# Patient Record
Sex: FEMALE | URBAN - METROPOLITAN AREA
[De-identification: names, ages, dates, MRNs, and addresses within clinical notes are randomized per-mention and may not be internally consistent; named-entity substitution may affect disease eponyms.]

---

## 2019-04-22 ENCOUNTER — APPOINTMENT (OUTPATIENT)
Age: 19
Setting detail: DERMATOLOGY
End: 2019-04-22

## 2019-04-22 VITALS — HEIGHT: 70 IN | RESPIRATION RATE: 16 BRPM | WEIGHT: 190 LBS

## 2019-04-22 DIAGNOSIS — L65.9 NONSCARRING HAIR LOSS, UNSPECIFIED: ICD-10-CM

## 2019-04-22 DIAGNOSIS — L70.0 ACNE VULGARIS: ICD-10-CM

## 2019-04-22 DIAGNOSIS — L90.5 SCAR CONDITIONS AND FIBROSIS OF SKIN: ICD-10-CM

## 2019-04-22 PROCEDURE — OTHER PRESCRIPTION: OTHER

## 2019-04-22 PROCEDURE — 99214 OFFICE O/P EST MOD 30 MIN: CPT | Mod: 25

## 2019-04-22 PROCEDURE — 11900 INJECT SKIN LESIONS </W 7: CPT

## 2019-04-22 PROCEDURE — OTHER INTRALESIONAL KENALOG: OTHER

## 2019-04-22 PROCEDURE — OTHER COUNSELING: OTHER

## 2019-04-22 RX ORDER — ADAPALENE 3 MG/G
THIN LAYER GEL TOPICAL QHS
Qty: 1 | Refills: 2 | Status: ERX | COMMUNITY
Start: 2019-04-22

## 2019-04-22 RX ORDER — DROSPIRENONE AND ETHINYL ESTRADIOL 0.02-3(28)
30 KIT ORAL DAILY
Qty: 30 | Refills: 2 | Status: ERX | COMMUNITY
Start: 2019-04-22

## 2019-04-22 ASSESSMENT — LOCATION SIMPLE DESCRIPTION DERM
LOCATION SIMPLE: NOSE
LOCATION SIMPLE: RIGHT SCALP
LOCATION SIMPLE: LEFT SCALP
LOCATION SIMPLE: LEFT FOREHEAD
LOCATION SIMPLE: NOSE
LOCATION SIMPLE: LEFT CHEEK

## 2019-04-22 ASSESSMENT — LOCATION DETAILED DESCRIPTION DERM
LOCATION DETAILED: LEFT INFERIOR CENTRAL MALAR CHEEK
LOCATION DETAILED: NASAL SUPRATIP
LOCATION DETAILED: LEFT CENTRAL FRONTAL SCALP
LOCATION DETAILED: NASAL TIP
LOCATION DETAILED: LEFT SUPERIOR MEDIAL FOREHEAD
LOCATION DETAILED: RIGHT LATERAL FRONTAL SCALP
LOCATION DETAILED: LEFT CENTRAL MALAR CHEEK
LOCATION DETAILED: LEFT MEDIAL MALAR CHEEK

## 2019-04-22 ASSESSMENT — LOCATION ZONE DERM
LOCATION ZONE: SCALP
LOCATION ZONE: FACE
LOCATION ZONE: NOSE
LOCATION ZONE: NOSE

## 2019-04-22 NOTE — HPI: SCAR
How Severe Is Your Scar?: mild
Is This A New Presentation, Or A Follow-Up?: Scar Follow Up
Additional History: Treated one month prior with silver nitrate.  Site is improving

## 2019-04-22 NOTE — PROCEDURE: COUNSELING
Tetracycline Counseling: Patient counseled regarding possible photosensitivity and increased risk for sunburn.  Patient instructed to avoid sunlight, if possible.  When exposed to sunlight, patients should wear protective clothing, sunglasses, and sunscreen.  The patient was instructed to call the office immediately if the following severe adverse effects occur:  hearing changes, easy bruising/bleeding, severe headache, or vision changes.  The patient verbalized understanding of the proper use and possible adverse effects of tetracycline.  All of the patient's questions and concerns were addressed. Patient understands to avoid pregnancy while on therapy due to potential birth defects.
Bactrim Pregnancy And Lactation Text: This medication is Pregnancy Category D and is known to cause fetal risk.  It is also excreted in breast milk.
Erythromycin Counseling:  I discussed with the patient the risks of erythromycin including but not limited to GI upset, allergic reaction, drug rash, diarrhea, increase in liver enzymes, and yeast infections.
Topical Retinoid Pregnancy And Lactation Text: This medication is Pregnancy Category C. It is unknown if this medication is excreted in breast milk.
Minocycline Pregnancy And Lactation Text: This medication is Pregnancy Category D and not consider safe during pregnancy. It is also excreted in breast milk.
Topical Retinoid counseling:  Patient advised to apply a pea-sized amount only at bedtime and wait 30 minutes after washing their face before applying.  If too drying, patient may add a non-comedogenic moisturizer. The patient verbalized understanding of the proper use and possible adverse effects of retinoids.  All of the patient's questions and concerns were addressed.
Use Enhanced Medication Counseling?: No
Topical Sulfur Applications Counseling: Topical Sulfur Counseling: Patient counseled that this medication may cause skin irritation or allergic reactions.  In the event of skin irritation, the patient was advised to reduce the amount of the drug applied or use it less frequently.   The patient verbalized understanding of the proper use and possible adverse effects of topical sulfur application.  All of the patient's questions and concerns were addressed.
Doxycycline Pregnancy And Lactation Text: This medication is Pregnancy Category D and not consider safe during pregnancy. It is also excreted in breast milk but is considered safe for shorter treatment courses.
Detail Level: Zone
Benzoyl Peroxide Pregnancy And Lactation Text: This medication is Pregnancy Category C. It is unknown if benzoyl peroxide is excreted in breast milk.
Erythromycin Pregnancy And Lactation Text: This medication is Pregnancy Category B and is considered safe during pregnancy. It is also excreted in breast milk.
Topical Sulfur Applications Pregnancy And Lactation Text: This medication is Pregnancy Category C and has an unknown safety profile during pregnancy. It is unknown if this topical medication is excreted in breast milk.
Isotretinoin Pregnancy And Lactation Text: This medication is Pregnancy Category X and is considered extremely dangerous during pregnancy. It is unknown if it is excreted in breast milk.
Detail Level: Detailed
Bactrim Counseling:  I discussed with the patient the risks of sulfa antibiotics including but not limited to GI upset, allergic reaction, drug rash, diarrhea, dizziness, photosensitivity, and yeast infections.  Rarely, more serious reactions can occur including but not limited to aplastic anemia, agranulocytosis, methemoglobinemia, blood dyscrasias, liver or kidney failure, lung infiltrates or desquamative/blistering drug rashes.
Tazorac Pregnancy And Lactation Text: This medication is not safe during pregnancy. It is unknown if this medication is excreted in breast milk.
Azithromycin Counseling:  I discussed with the patient the risks of azithromycin including but not limited to GI upset, allergic reaction, drug rash, diarrhea, and yeast infections.
High Dose Vitamin A Counseling: Side effects reviewed, pt to contact office should one occur.
Birth Control Pills Counseling: Birth Control Pill Counseling: I discussed with the patient the potential side effects of OCPs including but not limited to increased risk of stroke, heart attack, thrombophlebitis, deep venous thrombosis, hepatic adenomas, breast changes, GI upset, headaches, and depression.  The patient verbalized understanding of the proper use and possible adverse effects of OCPs. All of the patient's questions and concerns were addressed.
Benzoyl Peroxide Counseling: Patient counseled that medicine may cause skin irritation and bleach clothing.  In the event of skin irritation, the patient was advised to reduce the amount of the drug applied or use it less frequently.   The patient verbalized understanding of the proper use and possible adverse effects of benzoyl peroxide.  All of the patient's questions and concerns were addressed.
Spironolactone Counseling: Patient advised regarding risks of diarrhea, abdominal pain, hyperkalemia, birth defects (for female patients), liver toxicity and renal toxicity. The patient may need blood work to monitor liver and kidney function and potassium levels while on therapy. The patient verbalized understanding of the proper use and possible adverse effects of spironolactone.  All of the patient's questions and concerns were addressed.
Minocycline Counseling: Patient advised regarding possible photosensitivity and discoloration of the teeth, skin, lips, tongue and gums.  Patient instructed to avoid sunlight, if possible.  When exposed to sunlight, patients should wear protective clothing, sunglasses, and sunscreen.  The patient was instructed to call the office immediately if the following severe adverse effects occur:  hearing changes, easy bruising/bleeding, severe headache, or vision changes.  The patient verbalized understanding of the proper use and possible adverse effects of minocycline.  All of the patient's questions and concerns were addressed.
Dapsone Counseling: I discussed with the patient the risks of dapsone including but not limited to hemolytic anemia, agranulocytosis, rashes, methemoglobinemia, kidney failure, peripheral neuropathy, headaches, GI upset, and liver toxicity.  Patients who start dapsone require monitoring including baseline LFTs and weekly CBCs for the first month, then every month thereafter.  The patient verbalized understanding of the proper use and possible adverse effects of dapsone.  All of the patient's questions and concerns were addressed.
Topical Clindamycin Pregnancy And Lactation Text: This medication is Pregnancy Category B and is considered safe during pregnancy. It is unknown if it is excreted in breast milk.
Isotretinoin Counseling: Patient should get monthly blood tests, not donate blood, not drive at night if vision affected, not share medication, and not undergo elective surgery for 6 months after tx completed. Side effects reviewed, pt to contact office should one occur.
High Dose Vitamin A Pregnancy And Lactation Text: High dose vitamin A therapy is contraindicated during pregnancy and breast feeding.
Birth Control Pills Pregnancy And Lactation Text: This medication should be avoided if pregnant and for the first 30 days post-partum.
Spironolactone Pregnancy And Lactation Text: This medication can cause feminization of the male fetus and should be avoided during pregnancy. The active metabolite is also found in breast milk.
Azithromycin Pregnancy And Lactation Text: This medication is considered safe during pregnancy and is also secreted in breast milk.
Topical Clindamycin Counseling: Patient counseled that this medication may cause skin irritation or allergic reactions.  In the event of skin irritation, the patient was advised to reduce the amount of the drug applied or use it less frequently.   The patient verbalized understanding of the proper use and possible adverse effects of clindamycin.  All of the patient's questions and concerns were addressed.
Dapsone Pregnancy And Lactation Text: This medication is Pregnancy Category C and is not considered safe during pregnancy or breast feeding.
Tazorac Counseling:  Patient advised that medication is irritating and drying.  Patient may need to apply sparingly and wash off after an hour before eventually leaving it on overnight.  The patient verbalized understanding of the proper use and possible adverse effects of tazorac.  All of the patient's questions and concerns were addressed.
Doxycycline Counseling:  Patient counseled regarding possible photosensitivity and increased risk for sunburn.  Patient instructed to avoid sunlight, if possible.  When exposed to sunlight, patients should wear protective clothing, sunglasses, and sunscreen.  The patient was instructed to call the office immediately if the following severe adverse effects occur:  hearing changes, easy bruising/bleeding, severe headache, or vision changes.  The patient verbalized understanding of the proper use and possible adverse effects of doxycycline.  All of the patient's questions and concerns were addressed.

## 2019-05-15 ENCOUNTER — APPOINTMENT (OUTPATIENT)
Age: 19
Setting detail: DERMATOLOGY
End: 2019-05-16

## 2019-05-15 VITALS — WEIGHT: 194 LBS | RESPIRATION RATE: 16 BRPM | HEIGHT: 68 IN

## 2019-05-15 DIAGNOSIS — L63.8 OTHER ALOPECIA AREATA: ICD-10-CM

## 2019-05-15 DIAGNOSIS — L72.0 EPIDERMAL CYST: ICD-10-CM

## 2019-05-15 DIAGNOSIS — L91.0 HYPERTROPHIC SCAR: ICD-10-CM

## 2019-05-15 PROCEDURE — OTHER INTRALESIONAL KENALOG: OTHER

## 2019-05-15 PROCEDURE — OTHER COUNSELING: OTHER

## 2019-05-15 PROCEDURE — 11901 INJECT SKIN LESIONS >7: CPT

## 2019-05-15 ASSESSMENT — LOCATION SIMPLE DESCRIPTION DERM
LOCATION SIMPLE: SCALP
LOCATION SIMPLE: LEFT EYEBROW
LOCATION SIMPLE: RIGHT NOSE

## 2019-05-15 ASSESSMENT — LOCATION ZONE DERM
LOCATION ZONE: SCALP
LOCATION ZONE: NOSE
LOCATION ZONE: FACE

## 2019-05-15 ASSESSMENT — LOCATION DETAILED DESCRIPTION DERM
LOCATION DETAILED: RIGHT NASAL ALA
LOCATION DETAILED: RIGHT INFERIOR PARIETAL SCALP
LOCATION DETAILED: LEFT LATERAL EYEBROW
LOCATION DETAILED: LEFT INFERIOR PARIETAL SCALP
LOCATION DETAILED: RIGHT CENTRAL FRONTAL SCALP

## 2019-07-02 ENCOUNTER — APPOINTMENT (OUTPATIENT)
Age: 19
Setting detail: DERMATOLOGY
End: 2019-07-03

## 2019-07-02 VITALS — WEIGHT: 195 LBS | RESPIRATION RATE: 16 BRPM | HEIGHT: 68 IN

## 2019-07-02 DIAGNOSIS — L70.0 ACNE VULGARIS: ICD-10-CM

## 2019-07-02 DIAGNOSIS — L63.8 OTHER ALOPECIA AREATA: ICD-10-CM

## 2019-07-02 DIAGNOSIS — B35.4 TINEA CORPORIS: ICD-10-CM

## 2019-07-02 PROCEDURE — 11900 INJECT SKIN LESIONS </W 7: CPT

## 2019-07-02 PROCEDURE — 99214 OFFICE O/P EST MOD 30 MIN: CPT | Mod: 25

## 2019-07-02 PROCEDURE — OTHER COUNSELING: OTHER

## 2019-07-02 PROCEDURE — OTHER PRESCRIPTION: OTHER

## 2019-07-02 PROCEDURE — OTHER INTRALESIONAL KENALOG: OTHER

## 2019-07-02 PROCEDURE — OTHER PRESCRIPTION MEDICATION MANAGEMENT: OTHER

## 2019-07-02 RX ORDER — CLOBETASOL PROPIONATE 0.46 MG/ML
THIN LAYER SOLUTION TOPICAL BID
Qty: 1 | Refills: 0 | Status: ERX

## 2019-07-02 RX ORDER — CLINDAMYCIN PHOSPHATE 1 %
THIN LAYER SWAB, MEDICATED TOPICAL TWICE DAILY
Qty: 1 | Refills: 3 | Status: ERX | COMMUNITY
Start: 2019-07-02

## 2019-07-02 ASSESSMENT — LOCATION ZONE DERM
LOCATION ZONE: SCALP
LOCATION ZONE: NECK
LOCATION ZONE: FACE

## 2019-07-02 ASSESSMENT — LOCATION DETAILED DESCRIPTION DERM
LOCATION DETAILED: LEFT INFERIOR PARIETAL SCALP
LOCATION DETAILED: LEFT INFERIOR ANTERIOR NECK
LOCATION DETAILED: RIGHT INFERIOR PARIETAL SCALP
LOCATION DETAILED: LEFT CENTRAL MALAR CHEEK
LOCATION DETAILED: RIGHT CENTRAL FRONTAL SCALP

## 2019-07-02 ASSESSMENT — LOCATION SIMPLE DESCRIPTION DERM
LOCATION SIMPLE: SCALP
LOCATION SIMPLE: LEFT ANTERIOR NECK
LOCATION SIMPLE: LEFT CHEEK

## 2019-07-02 NOTE — PROCEDURE: PRESCRIPTION MEDICATION MANAGEMENT
Detail Level: Generalized
Continue Regimen: Laura and Differin as prescribed
Render In Strict Bullet Format?: No
Initiate Treatment: Clindamycin in morning
Otc Regimen: Continue vitamin E at night

## 2019-07-02 NOTE — PROCEDURE: COUNSELING
Detail Level: Zone
Bactrim Counseling:  I discussed with the patient the risks of sulfa antibiotics including but not limited to GI upset, allergic reaction, drug rash, diarrhea, dizziness, photosensitivity, and yeast infections.  Rarely, more serious reactions can occur including but not limited to aplastic anemia, agranulocytosis, methemoglobinemia, blood dyscrasias, liver or kidney failure, lung infiltrates or desquamative/blistering drug rashes.
Bactrim Pregnancy And Lactation Text: This medication is Pregnancy Category D and is known to cause fetal risk.  It is also excreted in breast milk.
Minocycline Pregnancy And Lactation Text: This medication is Pregnancy Category D and not consider safe during pregnancy. It is also excreted in breast milk.
Topical Clindamycin Counseling: Patient counseled that this medication may cause skin irritation or allergic reactions.  In the event of skin irritation, the patient was advised to reduce the amount of the drug applied or use it less frequently.   The patient verbalized understanding of the proper use and possible adverse effects of clindamycin.  All of the patient's questions and concerns were addressed.
Tazorac Pregnancy And Lactation Text: This medication is not safe during pregnancy. It is unknown if this medication is excreted in breast milk.
High Dose Vitamin A Counseling: Side effects reviewed, pt to contact office should one occur.
Erythromycin Counseling:  I discussed with the patient the risks of erythromycin including but not limited to GI upset, allergic reaction, drug rash, diarrhea, increase in liver enzymes, and yeast infections.
Azithromycin Pregnancy And Lactation Text: This medication is considered safe during pregnancy and is also secreted in breast milk.
Topical Sulfur Applications Pregnancy And Lactation Text: This medication is Pregnancy Category C and has an unknown safety profile during pregnancy. It is unknown if this topical medication is excreted in breast milk.
Doxycycline Counseling:  Patient counseled regarding possible photosensitivity and increased risk for sunburn.  Patient instructed to avoid sunlight, if possible.  When exposed to sunlight, patients should wear protective clothing, sunglasses, and sunscreen.  The patient was instructed to call the office immediately if the following severe adverse effects occur:  hearing changes, easy bruising/bleeding, severe headache, or vision changes.  The patient verbalized understanding of the proper use and possible adverse effects of doxycycline.  All of the patient's questions and concerns were addressed.
Tetracycline Counseling: Patient counseled regarding possible photosensitivity and increased risk for sunburn.  Patient instructed to avoid sunlight, if possible.  When exposed to sunlight, patients should wear protective clothing, sunglasses, and sunscreen.  The patient was instructed to call the office immediately if the following severe adverse effects occur:  hearing changes, easy bruising/bleeding, severe headache, or vision changes.  The patient verbalized understanding of the proper use and possible adverse effects of tetracycline.  All of the patient's questions and concerns were addressed. Patient understands to avoid pregnancy while on therapy due to potential birth defects.
High Dose Vitamin A Pregnancy And Lactation Text: High dose vitamin A therapy is contraindicated during pregnancy and breast feeding.
Tazorac Counseling:  Patient advised that medication is irritating and drying.  Patient may need to apply sparingly and wash off after an hour before eventually leaving it on overnight.  The patient verbalized understanding of the proper use and possible adverse effects of tazorac.  All of the patient's questions and concerns were addressed.
Benzoyl Peroxide Pregnancy And Lactation Text: This medication is Pregnancy Category C. It is unknown if benzoyl peroxide is excreted in breast milk.
Spironolactone Counseling: Patient advised regarding risks of diarrhea, abdominal pain, hyperkalemia, birth defects (for female patients), liver toxicity and renal toxicity. The patient may need blood work to monitor liver and kidney function and potassium levels while on therapy. The patient verbalized understanding of the proper use and possible adverse effects of spironolactone.  All of the patient's questions and concerns were addressed.
Dapsone Counseling: I discussed with the patient the risks of dapsone including but not limited to hemolytic anemia, agranulocytosis, rashes, methemoglobinemia, kidney failure, peripheral neuropathy, headaches, GI upset, and liver toxicity.  Patients who start dapsone require monitoring including baseline LFTs and weekly CBCs for the first month, then every month thereafter.  The patient verbalized understanding of the proper use and possible adverse effects of dapsone.  All of the patient's questions and concerns were addressed.
Isotretinoin Pregnancy And Lactation Text: This medication is Pregnancy Category X and is considered extremely dangerous during pregnancy. It is unknown if it is excreted in breast milk.
Dapsone Pregnancy And Lactation Text: This medication is Pregnancy Category C and is not considered safe during pregnancy or breast feeding.
Use Enhanced Medication Counseling?: No
Isotretinoin Counseling: Patient should get monthly blood tests, not donate blood, not drive at night if vision affected, not share medication, and not undergo elective surgery for 6 months after tx completed. Side effects reviewed, pt to contact office should one occur.
Birth Control Pills Pregnancy And Lactation Text: This medication should be avoided if pregnant and for the first 30 days post-partum.
Doxycycline Pregnancy And Lactation Text: This medication is Pregnancy Category D and not consider safe during pregnancy. It is also excreted in breast milk but is considered safe for shorter treatment courses.
Topical Retinoid Pregnancy And Lactation Text: This medication is Pregnancy Category C. It is unknown if this medication is excreted in breast milk.
Benzoyl Peroxide Counseling: Patient counseled that medicine may cause skin irritation and bleach clothing.  In the event of skin irritation, the patient was advised to reduce the amount of the drug applied or use it less frequently.   The patient verbalized understanding of the proper use and possible adverse effects of benzoyl peroxide.  All of the patient's questions and concerns were addressed.
Spironolactone Pregnancy And Lactation Text: This medication can cause feminization of the male fetus and should be avoided during pregnancy. The active metabolite is also found in breast milk.
Topical Sulfur Applications Counseling: Topical Sulfur Counseling: Patient counseled that this medication may cause skin irritation or allergic reactions.  In the event of skin irritation, the patient was advised to reduce the amount of the drug applied or use it less frequently.   The patient verbalized understanding of the proper use and possible adverse effects of topical sulfur application.  All of the patient's questions and concerns were addressed.
Topical Retinoid counseling:  Patient advised to apply a pea-sized amount only at bedtime and wait 30 minutes after washing their face before applying.  If too drying, patient may add a non-comedogenic moisturizer. The patient verbalized understanding of the proper use and possible adverse effects of retinoids.  All of the patient's questions and concerns were addressed.
Birth Control Pills Counseling: Birth Control Pill Counseling: I discussed with the patient the potential side effects of OCPs including but not limited to increased risk of stroke, heart attack, thrombophlebitis, deep venous thrombosis, hepatic adenomas, breast changes, GI upset, headaches, and depression.  The patient verbalized understanding of the proper use and possible adverse effects of OCPs. All of the patient's questions and concerns were addressed.
Minocycline Counseling: Patient advised regarding possible photosensitivity and discoloration of the teeth, skin, lips, tongue and gums.  Patient instructed to avoid sunlight, if possible.  When exposed to sunlight, patients should wear protective clothing, sunglasses, and sunscreen.  The patient was instructed to call the office immediately if the following severe adverse effects occur:  hearing changes, easy bruising/bleeding, severe headache, or vision changes.  The patient verbalized understanding of the proper use and possible adverse effects of minocycline.  All of the patient's questions and concerns were addressed.
Topical Clindamycin Pregnancy And Lactation Text: This medication is Pregnancy Category B and is considered safe during pregnancy. It is unknown if it is excreted in breast milk.
Azithromycin Counseling:  I discussed with the patient the risks of azithromycin including but not limited to GI upset, allergic reaction, drug rash, diarrhea, and yeast infections.
Erythromycin Pregnancy And Lactation Text: This medication is Pregnancy Category B and is considered safe during pregnancy. It is also excreted in breast milk.

## 2019-07-16 ENCOUNTER — RX ONLY (RX ONLY)
Age: 19
End: 2019-07-16

## 2019-07-16 RX ORDER — DROSPIRENONE AND ETHINYL ESTRADIOL 0.02-3(28)
KIT ORAL QD
Qty: 84 | Refills: 4 | Status: ERX

## 2019-08-06 ENCOUNTER — TRANSFERRED RECORDS (OUTPATIENT)
Dept: HEALTH INFORMATION MANAGEMENT | Facility: CLINIC | Age: 19
End: 2019-08-06

## 2019-08-06 ENCOUNTER — APPOINTMENT (OUTPATIENT)
Age: 19
Setting detail: DERMATOLOGY
End: 2019-08-06

## 2019-08-06 VITALS — HEIGHT: 68 IN | RESPIRATION RATE: 14 BRPM | WEIGHT: 195 LBS

## 2019-08-06 DIAGNOSIS — L70.0 ACNE VULGARIS: ICD-10-CM

## 2019-08-06 DIAGNOSIS — L63.8 OTHER ALOPECIA AREATA: ICD-10-CM

## 2019-08-06 PROCEDURE — OTHER INTRALESIONAL KENALOG: OTHER

## 2019-08-06 PROCEDURE — OTHER COUNSELING: OTHER

## 2019-08-06 PROCEDURE — 11900 INJECT SKIN LESIONS </W 7: CPT

## 2019-08-06 PROCEDURE — 99214 OFFICE O/P EST MOD 30 MIN: CPT | Mod: 25

## 2019-08-06 PROCEDURE — OTHER PRESCRIPTION: OTHER

## 2019-08-06 RX ORDER — ADAPALENE 3 MG/G
0.3% GEL TOPICAL QHS
Qty: 45 | Refills: 1 | Status: ERX | COMMUNITY
Start: 2019-08-06

## 2019-08-06 ASSESSMENT — LOCATION SIMPLE DESCRIPTION DERM
LOCATION SIMPLE: SCALP
LOCATION SIMPLE: LEFT CHEEK

## 2019-08-06 ASSESSMENT — LOCATION ZONE DERM
LOCATION ZONE: SCALP
LOCATION ZONE: FACE

## 2019-08-06 ASSESSMENT — LOCATION DETAILED DESCRIPTION DERM
LOCATION DETAILED: LEFT MEDIAL MALAR CHEEK
LOCATION DETAILED: RIGHT SUPERIOR POSTAURICULAR SKIN
LOCATION DETAILED: LEFT CENTRAL PARIETAL SCALP
LOCATION DETAILED: RIGHT CENTRAL PARIETAL SCALP
LOCATION DETAILED: RIGHT INFERIOR PARIETAL SCALP

## 2019-08-06 NOTE — HPI: HAIR LOSS (ALOPECIA AREATA)
How Severe Is It?: mild
Is This A New Presentation, Or A Follow-Up?: Follow Up Alopecia Areata
Additional History: previously treated spots are improving, but some new areas are developing.

## 2019-08-06 NOTE — PROCEDURE: COUNSELING
Patient Specific Counseling (Will Not Stick From Patient To Patient): She is wanting a RF on her Adapalene. I agreed. RX sent. Discussed D/C the clindamycin and I’d not being controlled to re-start it.

## 2019-11-11 ENCOUNTER — APPOINTMENT (OUTPATIENT)
Age: 19
Setting detail: DERMATOLOGY
End: 2019-11-11

## 2019-11-11 VITALS — HEIGHT: 70 IN | WEIGHT: 175 LBS

## 2019-11-11 DIAGNOSIS — L63.8 OTHER ALOPECIA AREATA: ICD-10-CM

## 2019-11-11 DIAGNOSIS — L81.0 POSTINFLAMMATORY HYPERPIGMENTATION: ICD-10-CM

## 2019-11-11 DIAGNOSIS — L72.8 OTHER FOLLICULAR CYSTS OF THE SKIN AND SUBCUTANEOUS TISSUE: ICD-10-CM

## 2019-11-11 PROCEDURE — OTHER PRESCRIPTION: OTHER

## 2019-11-11 PROCEDURE — OTHER COUNSELING: OTHER

## 2019-11-11 PROCEDURE — 11900 INJECT SKIN LESIONS </W 7: CPT

## 2019-11-11 PROCEDURE — OTHER INTRALESIONAL KENALOG: OTHER

## 2019-11-11 PROCEDURE — 99213 OFFICE O/P EST LOW 20 MIN: CPT | Mod: 25

## 2019-11-11 RX ORDER — HYDROQUINONE 20 MG/G
CREAM TOPICAL
Qty: 1 | Refills: 0 | Status: ERX | COMMUNITY
Start: 2019-11-11

## 2019-11-11 ASSESSMENT — LOCATION DETAILED DESCRIPTION DERM
LOCATION DETAILED: RIGHT OCCIPITAL SCALP
LOCATION DETAILED: RIGHT INFERIOR POSTERIOR PARIETAL SCALP
LOCATION DETAILED: RIGHT LATERAL FOREHEAD
LOCATION DETAILED: LEFT INFERIOR MEDIAL MALAR CHEEK

## 2019-11-11 ASSESSMENT — LOCATION ZONE DERM
LOCATION ZONE: FACE
LOCATION ZONE: SCALP

## 2019-11-11 ASSESSMENT — LOCATION SIMPLE DESCRIPTION DERM
LOCATION SIMPLE: RIGHT FOREHEAD
LOCATION SIMPLE: POSTERIOR SCALP
LOCATION SIMPLE: LEFT CHEEK

## 2019-11-11 NOTE — PROCEDURE: INTRALESIONAL KENALOG
Consent: The risks of atrophy were reviewed with the patient.
X Size Of Lesion In Cm (Optional): 0
Medical Necessity Clause: This procedure was medically necessary because the lesions that were treated were:
Kenalog Preparation: Kenalog
Concentration Of Solution Injected (Mg/Ml): 2.0
Include Z78.9 (Other Specified Conditions Influencing Health Status) As An Associated Diagnosis?: No
Concentration Of Solution Injected (Mg/Ml): 5.0
Total Volume Injected (Ccs- Only Use Numbers And Decimals): .01
Detail Level: Detailed
Total Volume Injected (Ccs- Only Use Numbers And Decimals): 2

## 2019-12-20 ENCOUNTER — RX ONLY (RX ONLY)
Age: 19
End: 2019-12-20

## 2019-12-20 RX ORDER — CLOBETASOL PROPIONATE 0.5 MG/ML
THIN LAYER SOLUTION TOPICAL BID
Qty: 1 | Refills: 0 | Status: ERX

## 2019-12-23 ENCOUNTER — APPOINTMENT (OUTPATIENT)
Age: 19
Setting detail: DERMATOLOGY
End: 2019-12-31

## 2019-12-23 DIAGNOSIS — L63.8 OTHER ALOPECIA AREATA: ICD-10-CM

## 2019-12-23 PROCEDURE — OTHER PRESCRIPTION: OTHER

## 2019-12-23 PROCEDURE — OTHER INTRALESIONAL KENALOG: OTHER

## 2019-12-23 PROCEDURE — 99213 OFFICE O/P EST LOW 20 MIN: CPT | Mod: 25

## 2019-12-23 PROCEDURE — OTHER COUNSELING: OTHER

## 2019-12-23 RX ORDER — CLOBETASOL PROPIONATE 0.5 MG/ML
SOLUTION TOPICAL 1
Qty: 2 | Refills: 1 | Status: ERX | COMMUNITY
Start: 2019-12-23

## 2019-12-23 ASSESSMENT — LOCATION SIMPLE DESCRIPTION DERM
LOCATION SIMPLE: HAIR
LOCATION SIMPLE: SCALP
LOCATION SIMPLE: RIGHT FOREHEAD
LOCATION SIMPLE: LEFT FOREHEAD
LOCATION SIMPLE: POSTERIOR SCALP
LOCATION SIMPLE: LEFT SCALP

## 2019-12-23 ASSESSMENT — LOCATION DETAILED DESCRIPTION DERM
LOCATION DETAILED: MID-OCCIPITAL SCALP
LOCATION DETAILED: RIGHT SUPERIOR POSTAURICULAR SKIN
LOCATION DETAILED: RIGHT CENTRAL PARIETAL SCALP
LOCATION DETAILED: RIGHT INFERIOR FRONTAL SCALP
LOCATION DETAILED: LEFT CENTRAL FRONTAL SCALP
LOCATION DETAILED: RIGHT CENTRAL FRONTAL SCALP
LOCATION DETAILED: LEFT SUPERIOR FOREHEAD
LOCATION DETAILED: HAIR
LOCATION DETAILED: RIGHT SUPERIOR LATERAL FOREHEAD

## 2019-12-23 ASSESSMENT — LOCATION ZONE DERM
LOCATION ZONE: FACE
LOCATION ZONE: SCALP

## 2019-12-23 NOTE — HPI: HAIR LOSS (ALOPECIA AREATA)
How Severe Is It?: severe
Is This A New Presentation, Or A Follow-Up?: Follow Up Alopecia Areata
Additional History: Has tried Rogaine, no results. Broke out last time from her injections at the dermatologist near her school who did 148 injections. She attends school in [place].

## 2019-12-23 NOTE — PROCEDURE: INTRALESIONAL KENALOG
Concentration Of Solution Injected (Mg/Ml): 5.0
Total Volume Injected (Ccs- Only Use Numbers And Decimals): 3
Consent: The risks of atrophy were reviewed with the patient.
Medical Necessity Clause: This procedure was medically necessary because the lesions that were treated were:
Include Z78.9 (Other Specified Conditions Influencing Health Status) As An Associated Diagnosis?: No
X Size Of Lesion In Cm (Optional): 0
Kenalog Preparation: Kenalog
Detail Level: Detailed

## 2020-02-17 ENCOUNTER — RX ONLY (RX ONLY)
Age: 20
End: 2020-02-17

## 2020-02-17 ENCOUNTER — APPOINTMENT (OUTPATIENT)
Age: 20
Setting detail: DERMATOLOGY
End: 2020-02-17

## 2020-02-17 VITALS — WEIGHT: 167 LBS | RESPIRATION RATE: 16 BRPM | HEIGHT: 70 IN

## 2020-02-17 DIAGNOSIS — L70.0 ACNE VULGARIS: ICD-10-CM

## 2020-02-17 DIAGNOSIS — B36.0 PITYRIASIS VERSICOLOR: ICD-10-CM

## 2020-02-17 DIAGNOSIS — L63.8 OTHER ALOPECIA AREATA: ICD-10-CM

## 2020-02-17 DIAGNOSIS — L60.8 OTHER NAIL DISORDERS: ICD-10-CM

## 2020-02-17 PROCEDURE — 99213 OFFICE O/P EST LOW 20 MIN: CPT | Mod: 25

## 2020-02-17 PROCEDURE — OTHER COUNSELING: OTHER

## 2020-02-17 PROCEDURE — OTHER PRESCRIPTION: OTHER

## 2020-02-17 PROCEDURE — OTHER INTRALESIONAL KENALOG: OTHER

## 2020-02-17 PROCEDURE — 11901 INJECT SKIN LESIONS >7: CPT

## 2020-02-17 RX ORDER — KETOCONAZOLE 20 MG/G
BID CREAM TOPICAL BID
Qty: 1 | Refills: 1 | Status: ERX | COMMUNITY
Start: 2020-02-17

## 2020-02-17 ASSESSMENT — LOCATION DETAILED DESCRIPTION DERM
LOCATION DETAILED: LEFT CENTRAL OCCIPITAL SCALP
LOCATION DETAILED: LEFT LATERAL SUPERIOR CHEST
LOCATION DETAILED: EPIGASTRIC SKIN
LOCATION DETAILED: SUPERIOR THORACIC SPINE
LOCATION DETAILED: LEFT THUMBNAIL
LOCATION DETAILED: LEFT INDEX FINGERNAIL
LOCATION DETAILED: LEFT MIDDLE FINGERNAIL
LOCATION DETAILED: RIGHT SUPERIOR PARIETAL SCALP
LOCATION DETAILED: POSTERIOR MID-PARIETAL SCALP
LOCATION DETAILED: LEFT INFERIOR MEDIAL MALAR CHEEK
LOCATION DETAILED: LEFT CENTRAL PARIETAL SCALP
LOCATION DETAILED: LEFT OCCIPITAL SCALP
LOCATION DETAILED: LEFT POSTAURICULAR SKIN
LOCATION DETAILED: LEFT SMALL FINGERNAIL
LOCATION DETAILED: RIGHT INFERIOR CENTRAL MALAR CHEEK
LOCATION DETAILED: LEFT RING FINGERNAIL
LOCATION DETAILED: RIGHT RING FINGERNAIL
LOCATION DETAILED: RIGHT THUMBNAIL
LOCATION DETAILED: RIGHT MIDDLE FINGERNAIL
LOCATION DETAILED: RIGHT SMALL FINGERNAIL
LOCATION DETAILED: RIGHT INDEX FINGERNAIL

## 2020-02-17 ASSESSMENT — LOCATION SIMPLE DESCRIPTION DERM
LOCATION SIMPLE: LEFT CHEEK
LOCATION SIMPLE: RIGHT THUMBNAIL
LOCATION SIMPLE: RIGHT SMALL FINGERNAIL
LOCATION SIMPLE: RIGHT CHEEK
LOCATION SIMPLE: POSTERIOR SCALP
LOCATION SIMPLE: UPPER BACK
LOCATION SIMPLE: SCALP
LOCATION SIMPLE: ABDOMEN
LOCATION SIMPLE: LEFT SMALL FINGERNAIL
LOCATION SIMPLE: LEFT MIDDLE FINGERNAIL
LOCATION SIMPLE: CHEST
LOCATION SIMPLE: LEFT INDEX FINGERNAIL
LOCATION SIMPLE: LEFT THUMBNAIL
LOCATION SIMPLE: LEFT RING FINGERNAIL
LOCATION SIMPLE: RIGHT INDEX FINGERNAIL
LOCATION SIMPLE: RIGHT MIDDLE FINGERNAIL
LOCATION SIMPLE: RIGHT RING FINGERNAIL

## 2020-02-17 ASSESSMENT — LOCATION ZONE DERM
LOCATION ZONE: TRUNK
LOCATION ZONE: FACE
LOCATION ZONE: SCALP
LOCATION ZONE: FINGERNAIL

## 2020-02-17 NOTE — HPI: HAIR LOSS (ALOPECIA AREATA)
Is This A New Presentation, Or A Follow-Up?: Follow Up Alopecia Areata
Additional History: Also worried about nail brittleness. Taking biotin. Has been worked up for thyroid issues and had blood work done with primary. All was fine. Was told she was low in vitamin D and took a pill for a week.

## 2020-02-17 NOTE — PROCEDURE: COUNSELING
Topical Clindamycin Counseling: Patient counseled that this medication may cause skin irritation or allergic reactions.  In the event of skin irritation, the patient was advised to reduce the amount of the drug applied or use it less frequently.   The patient verbalized understanding of the proper use and possible adverse effects of clindamycin.  All of the patient's questions and concerns were addressed.
Birth Control Pills Counseling: Birth Control Pill Counseling: I discussed with the patient the potential side effects of OCPs including but not limited to increased risk of stroke, heart attack, thrombophlebitis, deep venous thrombosis, hepatic adenomas, breast changes, GI upset, headaches, and depression.  The patient verbalized understanding of the proper use and possible adverse effects of OCPs. All of the patient's questions and concerns were addressed.
Doxycycline Pregnancy And Lactation Text: This medication is Pregnancy Category D and not consider safe during pregnancy. It is also excreted in breast milk but is considered safe for shorter treatment courses.
High Dose Vitamin A Pregnancy And Lactation Text: High dose vitamin A therapy is contraindicated during pregnancy and breast feeding.
Tetracycline Pregnancy And Lactation Text: This medication is Pregnancy Category D and not consider safe during pregnancy. It is also excreted in breast milk.
Include Pregnancy/Lactation Warning?: No
Tetracycline Counseling: Patient counseled regarding possible photosensitivity and increased risk for sunburn.  Patient instructed to avoid sunlight, if possible.  When exposed to sunlight, patients should wear protective clothing, sunglasses, and sunscreen.  The patient was instructed to call the office immediately if the following severe adverse effects occur:  hearing changes, easy bruising/bleeding, severe headache, or vision changes.  The patient verbalized understanding of the proper use and possible adverse effects of tetracycline.  All of the patient's questions and concerns were addressed. Patient understands to avoid pregnancy while on therapy due to potential birth defects.
Patient Specific Counseling (Will Not Stick From Patient To Patient): Recommended continuing biotin, aquaphor to nails, and humidifier at night.\\nAdvised patient see PCP/ womens health provider to discuss refill of BCP and have vitamin D rechecked since it has been a couple years. 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 Care instructions of treated sites were explained to the patient in detail. Told patient to call with any concerns or questions. The patient verbalized understanding and agreement of the education provided and the treatment plan. Encouraged patient to schedule a follow up appointment right after visit. At the end of the visit, all questions had been answered and the patient was satisfied with the visit.
Detail Level: Zone
Patient Specific Counseling (Will Not Stick From Patient To Patient): The patient is here today for cortisone injections. Will treat with K5mg/mL.
Detail Level: Detailed
Bactrim Counseling:  I discussed with the patient the risks of sulfa antibiotics including but not limited to GI upset, allergic reaction, drug rash, diarrhea, dizziness, photosensitivity, and yeast infections.  Rarely, more serious reactions can occur including but not limited to aplastic anemia, agranulocytosis, methemoglobinemia, blood dyscrasias, liver or kidney failure, lung infiltrates or desquamative/blistering drug rashes.
Topical Retinoid counseling:  Patient advised to apply a pea-sized amount only at bedtime and wait 30 minutes after washing their face before applying.  If too drying, patient may add a non-comedogenic moisturizer. The patient verbalized understanding of the proper use and possible adverse effects of retinoids.  All of the patient's questions and concerns were addressed.
Birth Control Pills Pregnancy And Lactation Text: This medication should be avoided if pregnant and for the first 30 days post-partum.
Erythromycin Pregnancy And Lactation Text: This medication is Pregnancy Category B and is considered safe during pregnancy. It is also excreted in breast milk.
Topical Clindamycin Pregnancy And Lactation Text: This medication is Pregnancy Category B and is considered safe during pregnancy. It is unknown if it is excreted in breast milk.
Patient Specific Counseling (Will Not Stick From Patient To Patient): The patient asked for a refill for Ketoconazole cream. Refills approved per AMANDA GONZALEZ.
Bactrim Pregnancy And Lactation Text: This medication is Pregnancy Category D and is known to cause fetal risk.  It is also excreted in breast milk.
Isotretinoin Pregnancy And Lactation Text: This medication is Pregnancy Category X and is considered extremely dangerous during pregnancy. It is unknown if it is excreted in breast milk.
Azithromycin Pregnancy And Lactation Text: This medication is considered safe during pregnancy and is also secreted in breast milk.
Spironolactone Counseling: Patient advised regarding risks of diarrhea, abdominal pain, hyperkalemia, birth defects (for female patients), liver toxicity and renal toxicity. The patient may need blood work to monitor liver and kidney function and potassium levels while on therapy. The patient verbalized understanding of the proper use and possible adverse effects of spironolactone.  All of the patient's questions and concerns were addressed.
Benzoyl Peroxide Pregnancy And Lactation Text: This medication is Pregnancy Category C. It is unknown if benzoyl peroxide is excreted in breast milk.
Dapsone Pregnancy And Lactation Text: This medication is Pregnancy Category C and is not considered safe during pregnancy or breast feeding.
Benzoyl Peroxide Counseling: Patient counseled that medicine may cause skin irritation and bleach clothing.  In the event of skin irritation, the patient was advised to reduce the amount of the drug applied or use it less frequently.   The patient verbalized understanding of the proper use and possible adverse effects of benzoyl peroxide.  All of the patient's questions and concerns were addressed.
Spironolactone Pregnancy And Lactation Text: This medication can cause feminization of the male fetus and should be avoided during pregnancy. The active metabolite is also found in breast milk.
Topical Sulfur Applications Counseling: Topical Sulfur Counseling: Patient counseled that this medication may cause skin irritation or allergic reactions.  In the event of skin irritation, the patient was advised to reduce the amount of the drug applied or use it less frequently.   The patient verbalized understanding of the proper use and possible adverse effects of topical sulfur application.  All of the patient's questions and concerns were addressed.
Topical Sulfur Applications Pregnancy And Lactation Text: This medication is Pregnancy Category C and has an unknown safety profile during pregnancy. It is unknown if this topical medication is excreted in breast milk.
Erythromycin Counseling:  I discussed with the patient the risks of erythromycin including but not limited to GI upset, allergic reaction, drug rash, diarrhea, increase in liver enzymes, and yeast infections.
Doxycycline Counseling:  Patient counseled regarding possible photosensitivity and increased risk for sunburn.  Patient instructed to avoid sunlight, if possible.  When exposed to sunlight, patients should wear protective clothing, sunglasses, and sunscreen.  The patient was instructed to call the office immediately if the following severe adverse effects occur:  hearing changes, easy bruising/bleeding, severe headache, or vision changes.  The patient verbalized understanding of the proper use and possible adverse effects of doxycycline.  All of the patient's questions and concerns were addressed.
Minocycline Counseling: Patient advised regarding possible photosensitivity and discoloration of the teeth, skin, lips, tongue and gums.  Patient instructed to avoid sunlight, if possible.  When exposed to sunlight, patients should wear protective clothing, sunglasses, and sunscreen.  The patient was instructed to call the office immediately if the following severe adverse effects occur:  hearing changes, easy bruising/bleeding, severe headache, or vision changes.  The patient verbalized understanding of the proper use and possible adverse effects of minocycline.  All of the patient's questions and concerns were addressed.
Tazorac Counseling:  Patient advised that medication is irritating and drying.  Patient may need to apply sparingly and wash off after an hour before eventually leaving it on overnight.  The patient verbalized understanding of the proper use and possible adverse effects of tazorac.  All of the patient's questions and concerns were addressed.
Azithromycin Counseling:  I discussed with the patient the risks of azithromycin including but not limited to GI upset, allergic reaction, drug rash, diarrhea, and yeast infections.
Dapsone Counseling: I discussed with the patient the risks of dapsone including but not limited to hemolytic anemia, agranulocytosis, rashes, methemoglobinemia, kidney failure, peripheral neuropathy, headaches, GI upset, and liver toxicity.  Patients who start dapsone require monitoring including baseline LFTs and weekly CBCs for the first month, then every month thereafter.  The patient verbalized understanding of the proper use and possible adverse effects of dapsone.  All of the patient's questions and concerns were addressed.
Tazorac Pregnancy And Lactation Text: This medication is not safe during pregnancy. It is unknown if this medication is excreted in breast milk.
High Dose Vitamin A Counseling: Side effects reviewed, pt to contact office should one occur.
Patient Specific Counseling (Will Not Stick From Patient To Patient): Discussed PIH and instructed patient to continue the Adapalene 0.3% gel as directed as this will help lighten the color overtime.
Topical Retinoid Pregnancy And Lactation Text: This medication is Pregnancy Category C. It is unknown if this medication is excreted in breast milk.
Isotretinoin Counseling: Patient should get monthly blood tests, not donate blood, not drive at night if vision affected, not share medication, and not undergo elective surgery for 6 months after tx completed. Side effects reviewed, pt to contact office should one occur.

## 2020-02-20 ENCOUNTER — RX ONLY (RX ONLY)
Age: 20
End: 2020-02-20

## 2020-02-20 RX ORDER — CLOBETASOL PROPIONATE 0.5 MG/ML
THIN LAYER SOLUTION TOPICAL BID
Qty: 1 | Refills: 2 | Status: ERX | COMMUNITY
Start: 2020-02-20

## 2020-03-16 ENCOUNTER — APPOINTMENT (OUTPATIENT)
Age: 20
Setting detail: DERMATOLOGY
End: 2020-03-16

## 2020-03-16 DIAGNOSIS — L30.9 DERMATITIS, UNSPECIFIED: ICD-10-CM

## 2020-03-16 DIAGNOSIS — L63.8 OTHER ALOPECIA AREATA: ICD-10-CM

## 2020-03-16 PROCEDURE — OTHER ADDITIONAL NOTES: OTHER

## 2020-03-16 PROCEDURE — OTHER INTRALESIONAL KENALOG: OTHER

## 2020-03-16 PROCEDURE — OTHER COUNSELING: OTHER

## 2020-03-16 PROCEDURE — 99213 OFFICE O/P EST LOW 20 MIN: CPT | Mod: 25

## 2020-03-16 PROCEDURE — 11900 INJECT SKIN LESIONS </W 7: CPT

## 2020-03-16 ASSESSMENT — LOCATION SIMPLE DESCRIPTION DERM
LOCATION SIMPLE: SCALP
LOCATION SIMPLE: RIGHT OCCIPITAL SCALP

## 2020-03-16 ASSESSMENT — LOCATION DETAILED DESCRIPTION DERM
LOCATION DETAILED: RIGHT SUPERIOR OCCIPITAL SCALP
LOCATION DETAILED: LEFT CENTRAL PARIETAL SCALP
LOCATION DETAILED: RIGHT CENTRAL PARIETAL SCALP

## 2020-03-16 ASSESSMENT — LOCATION ZONE DERM: LOCATION ZONE: SCALP

## 2020-03-16 NOTE — HPI: HAIR LOSS (ALOPECIA AREATA)
How Severe Is It?: severe
Is This A New Presentation, Or A Follow-Up?: Follow Up Alopecia Areata
Additional History: Thinks some areas are better but some need more injections. Using topical clobetasol as ordered.

## 2020-03-16 NOTE — PROCEDURE: COUNSELING
Detail Level: Detailed
Patient Specific Counseling (Will Not Stick From Patient To Patient): Use OTC hydrocortisone. Be mindful of clothing rubbing and irritating skin. Wear supportive bras that are not too tight and change out of workout or sweaty clothing immediately.

## 2020-04-20 ENCOUNTER — RX ONLY (RX ONLY)
Age: 20
End: 2020-04-20

## 2020-04-20 RX ORDER — ADAPALENE 3 MG/G
THIN LAYER GEL TOPICAL QHS
Qty: 1 | Refills: 5 | Status: ERX | COMMUNITY
Start: 2020-04-20

## 2020-04-21 ENCOUNTER — RX ONLY (RX ONLY)
Age: 20
End: 2020-04-21

## 2020-04-21 RX ORDER — ADAPALENE 3 MG/G
GEL TOPICAL QHS
Qty: 1 | Refills: 5

## 2020-04-28 ENCOUNTER — RX ONLY (RX ONLY)
Age: 20
End: 2020-04-28

## 2020-05-26 ENCOUNTER — APPOINTMENT (OUTPATIENT)
Age: 20
Setting detail: DERMATOLOGY
End: 2020-05-27

## 2020-05-26 VITALS — HEIGHT: 70 IN | WEIGHT: 167 LBS

## 2020-05-26 DIAGNOSIS — L63.8 OTHER ALOPECIA AREATA: ICD-10-CM

## 2020-05-26 DIAGNOSIS — L70.0 ACNE VULGARIS: ICD-10-CM

## 2020-05-26 PROCEDURE — 99213 OFFICE O/P EST LOW 20 MIN: CPT | Mod: 25

## 2020-05-26 PROCEDURE — OTHER COUNSELING: OTHER

## 2020-05-26 PROCEDURE — 11900 INJECT SKIN LESIONS </W 7: CPT

## 2020-05-26 PROCEDURE — OTHER INTRALESIONAL KENALOG: OTHER

## 2020-05-26 PROCEDURE — OTHER ADDITIONAL NOTES: OTHER

## 2020-05-26 ASSESSMENT — LOCATION DETAILED DESCRIPTION DERM
LOCATION DETAILED: RIGHT OCCIPITAL SCALP
LOCATION DETAILED: LEFT OCCIPITAL SCALP

## 2020-05-26 ASSESSMENT — LOCATION ZONE DERM: LOCATION ZONE: SCALP

## 2020-05-26 ASSESSMENT — LOCATION SIMPLE DESCRIPTION DERM: LOCATION SIMPLE: POSTERIOR SCALP

## 2020-05-26 NOTE — HPI: HAIR LOSS (ALOPECIA AREATA)
How Severe Is It?: severe
Is This A New Presentation, Or A Follow-Up?: Follow Up Alopecia Areata
Additional History: Is currently flaring as she has been stressed having to find a job.\\n

## 2020-05-26 NOTE — PROCEDURE: ADDITIONAL NOTES
Additional Notes: Recommend that patient initiates Clobetasol 0.05% solution that she has at home.
Additional Notes: Discussed Hydroquinone for lightening PIH. She is currently using an over the counter strength and will call us if she is wanting to try the 4% cream. Recommend decreasing Adapalene use if she is getting dry patches and she should be sure to wash her hands thoroughly to prevent the topical from affecting areas such as her eyelids, etc.
Detail Level: Simple

## 2020-05-26 NOTE — PROCEDURE: COUNSELING
Benzoyl Peroxide Counseling: Patient counseled that medicine may cause skin irritation and bleach clothing.  In the event of skin irritation, the patient was advised to reduce the amount of the drug applied or use it less frequently.   The patient verbalized understanding of the proper use and possible adverse effects of benzoyl peroxide.  All of the patient's questions and concerns were addressed.
Include Pregnancy/Lactation Warning?: No
Birth Control Pills Counseling: Birth Control Pill Counseling: I discussed with the patient the potential side effects of OCPs including but not limited to increased risk of stroke, heart attack, thrombophlebitis, deep venous thrombosis, hepatic adenomas, breast changes, GI upset, headaches, and depression.  The patient verbalized understanding of the proper use and possible adverse effects of OCPs. All of the patient's questions and concerns were addressed.
Topical Clindamycin Counseling: Patient counseled that this medication may cause skin irritation or allergic reactions.  In the event of skin irritation, the patient was advised to reduce the amount of the drug applied or use it less frequently.   The patient verbalized understanding of the proper use and possible adverse effects of clindamycin.  All of the patient's questions and concerns were addressed.
Isotretinoin Counseling: Patient should get monthly blood tests, not donate blood, not drive at night if vision affected, not share medication, and not undergo elective surgery for 6 months after tx completed. Side effects reviewed, pt to contact office should one occur.
Topical Sulfur Applications Counseling: Topical Sulfur Counseling: Patient counseled that this medication may cause skin irritation or allergic reactions.  In the event of skin irritation, the patient was advised to reduce the amount of the drug applied or use it less frequently.   The patient verbalized understanding of the proper use and possible adverse effects of topical sulfur application.  All of the patient's questions and concerns were addressed.
Spironolactone Pregnancy And Lactation Text: This medication can cause feminization of the male fetus and should be avoided during pregnancy. The active metabolite is also found in breast milk.
Erythromycin Pregnancy And Lactation Text: This medication is Pregnancy Category B and is considered safe during pregnancy. It is also excreted in breast milk.
Sarecycline Counseling: Patient advised regarding possible photosensitivity and discoloration of the teeth, skin, lips, tongue and gums.  Patient instructed to avoid sunlight, if possible.  When exposed to sunlight, patients should wear protective clothing, sunglasses, and sunscreen.  The patient was instructed to call the office immediately if the following severe adverse effects occur:  hearing changes, easy bruising/bleeding, severe headache, or vision changes.  The patient verbalized understanding of the proper use and possible adverse effects of sarecycline.  All of the patient's questions and concerns were addressed.
Bactrim Pregnancy And Lactation Text: This medication is Pregnancy Category D and is known to cause fetal risk.  It is also excreted in breast milk.
Sarecycline Pregnancy And Lactation Text: This medication is Pregnancy Category D and not consider safe during pregnancy. It is also excreted in breast milk.
Doxycycline Pregnancy And Lactation Text: This medication is Pregnancy Category D and not consider safe during pregnancy. It is also excreted in breast milk but is considered safe for shorter treatment courses.
Spironolactone Counseling: Patient advised regarding risks of diarrhea, abdominal pain, hyperkalemia, birth defects (for female patients), liver toxicity and renal toxicity. The patient may need blood work to monitor liver and kidney function and potassium levels while on therapy. The patient verbalized understanding of the proper use and possible adverse effects of spironolactone.  All of the patient's questions and concerns were addressed.
Isotretinoin Pregnancy And Lactation Text: This medication is Pregnancy Category X and is considered extremely dangerous during pregnancy. It is unknown if it is excreted in breast milk.
Dapsone Pregnancy And Lactation Text: This medication is Pregnancy Category C and is not considered safe during pregnancy or breast feeding.
Topical Retinoid counseling:  Patient advised to apply a pea-sized amount only at bedtime and wait 30 minutes after washing their face before applying.  If too drying, patient may add a non-comedogenic moisturizer. The patient verbalized understanding of the proper use and possible adverse effects of retinoids.  All of the patient's questions and concerns were addressed.
Azithromycin Pregnancy And Lactation Text: This medication is considered safe during pregnancy and is also secreted in breast milk.
Minocycline Counseling: Patient advised regarding possible photosensitivity and discoloration of the teeth, skin, lips, tongue and gums.  Patient instructed to avoid sunlight, if possible.  When exposed to sunlight, patients should wear protective clothing, sunglasses, and sunscreen.  The patient was instructed to call the office immediately if the following severe adverse effects occur:  hearing changes, easy bruising/bleeding, severe headache, or vision changes.  The patient verbalized understanding of the proper use and possible adverse effects of minocycline.  All of the patient's questions and concerns were addressed.
Doxycycline Counseling:  Patient counseled regarding possible photosensitivity and increased risk for sunburn.  Patient instructed to avoid sunlight, if possible.  When exposed to sunlight, patients should wear protective clothing, sunglasses, and sunscreen.  The patient was instructed to call the office immediately if the following severe adverse effects occur:  hearing changes, easy bruising/bleeding, severe headache, or vision changes.  The patient verbalized understanding of the proper use and possible adverse effects of doxycycline.  All of the patient's questions and concerns were addressed.
Topical Retinoid Pregnancy And Lactation Text: This medication is Pregnancy Category C. It is unknown if this medication is excreted in breast milk.
Topical Sulfur Applications Pregnancy And Lactation Text: This medication is Pregnancy Category C and has an unknown safety profile during pregnancy. It is unknown if this topical medication is excreted in breast milk.
High Dose Vitamin A Counseling: Side effects reviewed, pt to contact office should one occur.
High Dose Vitamin A Pregnancy And Lactation Text: High dose vitamin A therapy is contraindicated during pregnancy and breast feeding.
Birth Control Pills Pregnancy And Lactation Text: This medication should be avoided if pregnant and for the first 30 days post-partum.
Topical Clindamycin Pregnancy And Lactation Text: This medication is Pregnancy Category B and is considered safe during pregnancy. It is unknown if it is excreted in breast milk.
Dapsone Counseling: I discussed with the patient the risks of dapsone including but not limited to hemolytic anemia, agranulocytosis, rashes, methemoglobinemia, kidney failure, peripheral neuropathy, headaches, GI upset, and liver toxicity.  Patients who start dapsone require monitoring including baseline LFTs and weekly CBCs for the first month, then every month thereafter.  The patient verbalized understanding of the proper use and possible adverse effects of dapsone.  All of the patient's questions and concerns were addressed.
Azithromycin Counseling:  I discussed with the patient the risks of azithromycin including but not limited to GI upset, allergic reaction, drug rash, diarrhea, and yeast infections.
Bactrim Counseling:  I discussed with the patient the risks of sulfa antibiotics including but not limited to GI upset, allergic reaction, drug rash, diarrhea, dizziness, photosensitivity, and yeast infections.  Rarely, more serious reactions can occur including but not limited to aplastic anemia, agranulocytosis, methemoglobinemia, blood dyscrasias, liver or kidney failure, lung infiltrates or desquamative/blistering drug rashes.
Detail Level: Detailed
Erythromycin Counseling:  I discussed with the patient the risks of erythromycin including but not limited to GI upset, allergic reaction, drug rash, diarrhea, increase in liver enzymes, and yeast infections.
Benzoyl Peroxide Pregnancy And Lactation Text: This medication is Pregnancy Category C. It is unknown if benzoyl peroxide is excreted in breast milk.
Tetracycline Counseling: Patient counseled regarding possible photosensitivity and increased risk for sunburn.  Patient instructed to avoid sunlight, if possible.  When exposed to sunlight, patients should wear protective clothing, sunglasses, and sunscreen.  The patient was instructed to call the office immediately if the following severe adverse effects occur:  hearing changes, easy bruising/bleeding, severe headache, or vision changes.  The patient verbalized understanding of the proper use and possible adverse effects of tetracycline.  All of the patient's questions and concerns were addressed. Patient understands to avoid pregnancy while on therapy due to potential birth defects.
Tazorac Counseling:  Patient advised that medication is irritating and drying.  Patient may need to apply sparingly and wash off after an hour before eventually leaving it on overnight.  The patient verbalized understanding of the proper use and possible adverse effects of tazorac.  All of the patient's questions and concerns were addressed.
Tazorac Pregnancy And Lactation Text: This medication is not safe during pregnancy. It is unknown if this medication is excreted in breast milk.

## 2020-06-30 ENCOUNTER — APPOINTMENT (OUTPATIENT)
Age: 20
Setting detail: DERMATOLOGY
End: 2020-06-30

## 2020-06-30 VITALS — WEIGHT: 160 LBS | RESPIRATION RATE: 16 BRPM | HEIGHT: 70 IN

## 2020-06-30 DIAGNOSIS — L63.8 OTHER ALOPECIA AREATA: ICD-10-CM

## 2020-06-30 DIAGNOSIS — L81.5 LEUKODERMA, NOT ELSEWHERE CLASSIFIED: ICD-10-CM

## 2020-06-30 PROCEDURE — OTHER INTRALESIONAL KENALOG: OTHER

## 2020-06-30 PROCEDURE — 99213 OFFICE O/P EST LOW 20 MIN: CPT | Mod: 25

## 2020-06-30 PROCEDURE — OTHER COUNSELING: OTHER

## 2020-06-30 PROCEDURE — 11901 INJECT SKIN LESIONS >7: CPT

## 2020-06-30 ASSESSMENT — LOCATION DETAILED DESCRIPTION DERM
LOCATION DETAILED: LEFT OCCIPITAL SCALP
LOCATION DETAILED: LEFT ANTERIOR DISTAL THIGH
LOCATION DETAILED: LEFT CENTRAL PARIETAL SCALP
LOCATION DETAILED: RIGHT PROXIMAL PRETIBIAL REGION
LOCATION DETAILED: LEFT CENTRAL FRONTAL SCALP
LOCATION DETAILED: LEFT INFERIOR PARIETAL SCALP
LOCATION DETAILED: POSTERIOR MID-PARIETAL SCALP
LOCATION DETAILED: LEFT CENTRAL OCCIPITAL SCALP
LOCATION DETAILED: LEFT SUPERIOR OCCIPITAL SCALP
LOCATION DETAILED: LEFT SUPERIOR FRONTAL SCALP
LOCATION DETAILED: RIGHT ANTERIOR DISTAL THIGH
LOCATION DETAILED: RIGHT INFERIOR LATERAL FOREHEAD
LOCATION DETAILED: RIGHT CENTRAL FRONTAL SCALP
LOCATION DETAILED: MID-OCCIPITAL SCALP
LOCATION DETAILED: LEFT INFERIOR FRONTAL SCALP
LOCATION DETAILED: RIGHT CENTRAL PARIETAL SCALP
LOCATION DETAILED: LEFT SUPERIOR POSTERIOR PARIETAL SCALP
LOCATION DETAILED: RIGHT INFERIOR FRONTAL SCALP
LOCATION DETAILED: RIGHT SUPERIOR POSTERIOR PARIETAL SCALP

## 2020-06-30 ASSESSMENT — LOCATION ZONE DERM
LOCATION ZONE: FACE
LOCATION ZONE: LEG
LOCATION ZONE: SCALP

## 2020-06-30 ASSESSMENT — LOCATION SIMPLE DESCRIPTION DERM
LOCATION SIMPLE: RIGHT THIGH
LOCATION SIMPLE: RIGHT PRETIBIAL REGION
LOCATION SIMPLE: RIGHT FOREHEAD
LOCATION SIMPLE: POSTERIOR SCALP
LOCATION SIMPLE: LEFT THIGH
LOCATION SIMPLE: SCALP

## 2020-06-30 NOTE — PROCEDURE: COUNSELING
Patient Specific Counseling (Will Not Stick From Patient To Patient): Will inject with Kenalog again. Patient agreed.
Detail Level: Detailed
Detail Level: Zone

## 2020-06-30 NOTE — HPI: HAIR LOSS (ALOPECIA AREATA)
How Severe Is It?: moderate
Is This A New Presentation, Or A Follow-Up?: Follow Up Alopecia Areata
Additional History: Taking her nclex exam next wk and is stressed

## 2020-07-28 ENCOUNTER — APPOINTMENT (OUTPATIENT)
Age: 20
Setting detail: DERMATOLOGY
End: 2020-07-28

## 2020-07-28 DIAGNOSIS — L63.8 OTHER ALOPECIA AREATA: ICD-10-CM

## 2020-07-28 PROCEDURE — OTHER COUNSELING: OTHER

## 2020-07-28 PROCEDURE — 11900 INJECT SKIN LESIONS </W 7: CPT

## 2020-07-28 PROCEDURE — OTHER INTRALESIONAL KENALOG: OTHER

## 2020-07-28 ASSESSMENT — LOCATION DETAILED DESCRIPTION DERM
LOCATION DETAILED: RIGHT SUPERIOR OCCIPITAL SCALP
LOCATION DETAILED: RIGHT OCCIPITAL SCALP
LOCATION DETAILED: RIGHT INFERIOR OCCIPITAL SCALP
LOCATION DETAILED: LEFT OCCIPITAL SCALP
LOCATION DETAILED: LEFT INFERIOR FRONTAL SCALP
LOCATION DETAILED: LEFT INFERIOR OCCIPITAL SCALP
LOCATION DETAILED: RIGHT INFERIOR FRONTAL SCALP
LOCATION DETAILED: POSTERIOR MID-PARIETAL SCALP

## 2020-07-28 ASSESSMENT — LOCATION ZONE DERM: LOCATION ZONE: SCALP

## 2020-07-28 ASSESSMENT — LOCATION SIMPLE DESCRIPTION DERM
LOCATION SIMPLE: SCALP
LOCATION SIMPLE: POSTERIOR SCALP

## 2020-07-28 NOTE — PROCEDURE: COUNSELING
Patient Specific Counseling (Will Not Stick From Patient To Patient): Will inject with Kenalog again. Patient agreed.
Detail Level: Zone

## 2020-07-28 NOTE — HPI: HAIR LOSS
How Did The Hair Loss Occur?: sudden in onset
How Severe Is Your Hair Loss?: moderate
What Hair Products Do You Use?: Doing better

## 2020-08-25 ENCOUNTER — APPOINTMENT (OUTPATIENT)
Dept: URBAN - METROPOLITAN AREA CLINIC 260 | Age: 20
Setting detail: DERMATOLOGY
End: 2020-08-25

## 2020-08-25 DIAGNOSIS — L81.5 LEUKODERMA, NOT ELSEWHERE CLASSIFIED: ICD-10-CM

## 2020-08-25 DIAGNOSIS — L63.8 OTHER ALOPECIA AREATA: ICD-10-CM

## 2020-08-25 PROCEDURE — OTHER INTRALESIONAL KENALOG: OTHER

## 2020-08-25 PROCEDURE — 99213 OFFICE O/P EST LOW 20 MIN: CPT | Mod: 25

## 2020-08-25 PROCEDURE — OTHER ADDITIONAL NOTES: OTHER

## 2020-08-25 PROCEDURE — OTHER COUNSELING: OTHER

## 2020-08-25 PROCEDURE — 11900 INJECT SKIN LESIONS </W 7: CPT

## 2020-08-25 ASSESSMENT — LOCATION SIMPLE DESCRIPTION DERM: LOCATION SIMPLE: RIGHT SCALP

## 2020-08-25 ASSESSMENT — LOCATION DETAILED DESCRIPTION DERM: LOCATION DETAILED: RIGHT MEDIAL FRONTAL SCALP

## 2020-08-25 ASSESSMENT — LOCATION ZONE DERM: LOCATION ZONE: SCALP

## 2020-08-25 NOTE — PROCEDURE: INTRALESIONAL KENALOG
Medical Necessity Clause: This procedure was medically necessary because the lesions that were treated were:
X Size Of Lesion In Cm (Optional): 0
Concentration Of Solution Injected (Mg/Ml): 5.0
Kenalog Preparation: Kenalog
Include Z78.9 (Other Specified Conditions Influencing Health Status) As An Associated Diagnosis?: No
Detail Level: Detailed
Consent: The risks of atrophy were reviewed with the patient.
Total Volume Injected (Ccs- Only Use Numbers And Decimals): 1.2
Administered By (Optional): NII

## 2020-09-09 ENCOUNTER — RECORDS - HEALTHEAST (OUTPATIENT)
Dept: LAB | Facility: CLINIC | Age: 20
End: 2020-09-09

## 2020-09-10 ENCOUNTER — RECORDS - HEALTHEAST (OUTPATIENT)
Dept: LAB | Facility: CLINIC | Age: 20
End: 2020-09-10

## 2020-09-10 LAB
ANION GAP SERPL CALCULATED.3IONS-SCNC: 10 MMOL/L (ref 5–18)
BUN SERPL-MCNC: 20 MG/DL (ref 8–22)
C TRACH DNA SPEC QL PROBE+SIG AMP: NEGATIVE
CALCIUM SERPL-MCNC: 9.8 MG/DL (ref 8.5–10.5)
CHLORIDE BLD-SCNC: 101 MMOL/L (ref 98–107)
CHOLEST SERPL-MCNC: 216 MG/DL
CO2 SERPL-SCNC: 28 MMOL/L (ref 22–31)
CREAT SERPL-MCNC: 0.78 MG/DL (ref 0.6–1.1)
FASTING STATUS PATIENT QL REPORTED: YES
GFR SERPL CREATININE-BSD FRML MDRD: >60 ML/MIN/1.73M2
GLUCOSE BLD-MCNC: 65 MG/DL (ref 70–125)
HDLC SERPL-MCNC: 126 MG/DL
LDLC SERPL CALC-MCNC: 81 MG/DL
POTASSIUM BLD-SCNC: 4.2 MMOL/L (ref 3.5–5)
SODIUM SERPL-SCNC: 139 MMOL/L (ref 136–145)
TRIGL SERPL-MCNC: 44 MG/DL

## 2020-09-11 LAB
25(OH)D3 SERPL-MCNC: 54.1 NG/ML (ref 30–80)
BKR LAB AP ABNORMAL BLEEDING: NO
BKR LAB AP BIRTH CONTROL/HORMONES: NORMAL
BKR LAB AP CERVICAL APPEARANCE: NORMAL
BKR LAB AP GYN ADEQUACY: NORMAL
BKR LAB AP GYN INTERPRETATION: NORMAL
BKR LAB AP GYN OTHER FINDINGS: NORMAL
BKR LAB AP HPV REFLEX: NORMAL
BKR LAB AP LMP: NORMAL
BKR LAB AP PATIENT STATUS: NORMAL
BKR LAB AP PREVIOUS ABNORMAL: NORMAL
BKR LAB AP PREVIOUS NORMAL: NORMAL
HIGH RISK?: NO
PATH REPORT.COMMENTS IMP SPEC: NORMAL
RESULT FLAG (HE HISTORICAL CONVERSION): NORMAL

## 2020-09-29 ENCOUNTER — APPOINTMENT (OUTPATIENT)
Dept: URBAN - METROPOLITAN AREA CLINIC 260 | Age: 20
Setting detail: DERMATOLOGY
End: 2020-09-29

## 2020-09-29 VITALS — RESPIRATION RATE: 14 BRPM | HEIGHT: 70 IN | WEIGHT: 170 LBS

## 2020-09-29 DIAGNOSIS — L63.8 OTHER ALOPECIA AREATA: ICD-10-CM

## 2020-09-29 DIAGNOSIS — L72.8 OTHER FOLLICULAR CYSTS OF THE SKIN AND SUBCUTANEOUS TISSUE: ICD-10-CM

## 2020-09-29 DIAGNOSIS — L72.0 EPIDERMAL CYST: ICD-10-CM

## 2020-09-29 PROCEDURE — 11901 INJECT SKIN LESIONS >7: CPT

## 2020-09-29 PROCEDURE — OTHER ADDITIONAL NOTES: OTHER

## 2020-09-29 PROCEDURE — OTHER COUNSELING: OTHER

## 2020-09-29 PROCEDURE — 99213 OFFICE O/P EST LOW 20 MIN: CPT | Mod: 25

## 2020-09-29 PROCEDURE — OTHER INTRALESIONAL KENALOG: OTHER

## 2020-09-29 ASSESSMENT — LOCATION DETAILED DESCRIPTION DERM
LOCATION DETAILED: MID-OCCIPITAL SCALP
LOCATION DETAILED: MID-FRONTAL SCALP
LOCATION DETAILED: LEFT MEDIAL CANTHUS
LOCATION DETAILED: RIGHT SUPERIOR FOREHEAD
LOCATION DETAILED: LEFT OCCIPITAL SCALP
LOCATION DETAILED: RIGHT INFERIOR OCCIPITAL SCALP
LOCATION DETAILED: RIGHT SUPERIOR FRONTAL SCALP
LOCATION DETAILED: LEFT SUPERIOR PARIETAL SCALP
LOCATION DETAILED: MEDIAL FRONTAL SCALP
LOCATION DETAILED: RIGHT CENTRAL FRONTAL SCALP
LOCATION DETAILED: LEFT MEDIAL FRONTAL SCALP
LOCATION DETAILED: RIGHT INFERIOR PARIETAL SCALP
LOCATION DETAILED: LEFT CENTRAL FRONTAL SCALP
LOCATION DETAILED: RIGHT INFERIOR FRONTAL SCALP
LOCATION DETAILED: LEFT CENTRAL PARIETAL SCALP
LOCATION DETAILED: LEFT MEDIAL SUPERIOR EYELID

## 2020-09-29 ASSESSMENT — LOCATION SIMPLE DESCRIPTION DERM
LOCATION SIMPLE: POSTERIOR SCALP
LOCATION SIMPLE: LEFT SCALP
LOCATION SIMPLE: LEFT SUPERIOR EYELID
LOCATION SIMPLE: RIGHT FOREHEAD
LOCATION SIMPLE: ANTERIOR SCALP
LOCATION SIMPLE: LEFT EYELID
LOCATION SIMPLE: FRONTAL SCALP
LOCATION SIMPLE: SCALP

## 2020-09-29 ASSESSMENT — LOCATION ZONE DERM
LOCATION ZONE: SCALP
LOCATION ZONE: EYELID
LOCATION ZONE: FACE

## 2020-09-29 NOTE — PROCEDURE: ADDITIONAL NOTES
Detail Level: Detailed
Additional Notes: Patient states Improvement in the lesions that were injected at last OV. 2 new patches today on superior frontal scalp and left temporal scalp

## 2020-09-29 NOTE — HPI: ANDROGENIC ALOPECIA
Is This A New Presentation, Or A Follow-Up?: Follow Up Androgenic Alopecia
How Severe Is Your Hair Loss?: moderate
Additional History: Noticed improvement in the previously injected areas, but noticing small new spots

## 2020-10-27 ENCOUNTER — APPOINTMENT (OUTPATIENT)
Dept: URBAN - METROPOLITAN AREA CLINIC 260 | Age: 20
Setting detail: DERMATOLOGY
End: 2020-10-27

## 2020-10-27 VITALS — WEIGHT: 170 LBS | HEIGHT: 70 IN

## 2020-10-27 DIAGNOSIS — L72.8 OTHER FOLLICULAR CYSTS OF THE SKIN AND SUBCUTANEOUS TISSUE: ICD-10-CM

## 2020-10-27 DIAGNOSIS — L63.8 OTHER ALOPECIA AREATA: ICD-10-CM

## 2020-10-27 PROCEDURE — OTHER COUNSELING: OTHER

## 2020-10-27 PROCEDURE — OTHER PRESCRIPTION: OTHER

## 2020-10-27 PROCEDURE — OTHER PHOTO-DOCUMENTATION: OTHER

## 2020-10-27 PROCEDURE — OTHER ADDITIONAL NOTES: OTHER

## 2020-10-27 PROCEDURE — OTHER INTRALESIONAL KENALOG: OTHER

## 2020-10-27 PROCEDURE — 11900 INJECT SKIN LESIONS </W 7: CPT

## 2020-10-27 RX ORDER — CLOBETASOL PROPIONATE 0.5 MG/ML
0.05% SOLUTION TOPICAL QD
Qty: 1 | Refills: 2 | Status: ERX | COMMUNITY
Start: 2020-10-27

## 2020-10-27 ASSESSMENT — LOCATION ZONE DERM
LOCATION ZONE: FACE
LOCATION ZONE: SCALP
LOCATION ZONE: EYELID

## 2020-10-27 ASSESSMENT — LOCATION SIMPLE DESCRIPTION DERM
LOCATION SIMPLE: LEFT FOREHEAD
LOCATION SIMPLE: LEFT SUPERIOR EYELID
LOCATION SIMPLE: SCALP
LOCATION SIMPLE: RIGHT FOREHEAD
LOCATION SIMPLE: ANTERIOR SCALP

## 2020-10-27 ASSESSMENT — LOCATION DETAILED DESCRIPTION DERM
LOCATION DETAILED: RIGHT SUPERIOR FOREHEAD
LOCATION DETAILED: LEFT SUPERIOR FOREHEAD
LOCATION DETAILED: LEFT CENTRAL PARIETAL SCALP
LOCATION DETAILED: LEFT INFERIOR FRONTAL SCALP
LOCATION DETAILED: LEFT INFERIOR PARIETAL SCALP
LOCATION DETAILED: MID-FRONTAL SCALP
LOCATION DETAILED: LEFT MEDIAL SUPERIOR EYELID
LOCATION DETAILED: RIGHT INFERIOR FRONTAL SCALP

## 2020-10-27 NOTE — PROCEDURE: ADDITIONAL NOTES
Detail Level: Detailed
Additional Notes: Recommend injections every 2 weeks instead of every 4 weeks. Improvement is noted on the sides but there is recent worsening on the mid frontal scalp. Patient notes stress as a potential cause.

## 2020-10-27 NOTE — PROCEDURE: PHOTO-DOCUMENTATION
Details (Free Text): scalp
Photo Preface (Leave Blank If You Do Not Want): Photographs were obtained today to monitor progress
Detail Level: Zone

## 2020-11-10 ENCOUNTER — APPOINTMENT (OUTPATIENT)
Dept: URBAN - METROPOLITAN AREA CLINIC 260 | Age: 20
Setting detail: DERMATOLOGY
End: 2020-11-10

## 2020-11-10 VITALS — WEIGHT: 170 LBS | HEIGHT: 70 IN

## 2020-11-10 DIAGNOSIS — L63.8 OTHER ALOPECIA AREATA: ICD-10-CM

## 2020-11-10 PROCEDURE — OTHER INTRALESIONAL KENALOG: OTHER

## 2020-11-10 PROCEDURE — OTHER ADDITIONAL NOTES: OTHER

## 2020-11-10 PROCEDURE — 11900 INJECT SKIN LESIONS </W 7: CPT

## 2020-11-10 PROCEDURE — OTHER COUNSELING: OTHER

## 2020-11-10 ASSESSMENT — LOCATION DETAILED DESCRIPTION DERM
LOCATION DETAILED: RIGHT SUPERIOR FOREHEAD
LOCATION DETAILED: LEFT CENTRAL PARIETAL SCALP
LOCATION DETAILED: MID-FRONTAL SCALP
LOCATION DETAILED: LEFT INFERIOR PARIETAL SCALP
LOCATION DETAILED: LEFT SUPERIOR FOREHEAD
LOCATION DETAILED: RIGHT INFERIOR FRONTAL SCALP
LOCATION DETAILED: LEFT INFERIOR FRONTAL SCALP

## 2020-11-10 ASSESSMENT — LOCATION ZONE DERM
LOCATION ZONE: FACE
LOCATION ZONE: SCALP

## 2020-11-10 ASSESSMENT — LOCATION SIMPLE DESCRIPTION DERM
LOCATION SIMPLE: RIGHT FOREHEAD
LOCATION SIMPLE: SCALP
LOCATION SIMPLE: LEFT FOREHEAD
LOCATION SIMPLE: ANTERIOR SCALP

## 2020-11-10 NOTE — PROCEDURE: COUNSELING
Detail Level: Detailed
Patient Specific Counseling (Will Not Stick From Patient To Patient): Continue Clobetasol soln

## 2020-11-10 NOTE — PROCEDURE: ADDITIONAL NOTES
Detail Level: Detailed
Additional Notes: Recommend injections every 2 weeks instead of every 4 weeks.

## 2020-11-24 ENCOUNTER — APPOINTMENT (OUTPATIENT)
Dept: URBAN - METROPOLITAN AREA CLINIC 260 | Age: 20
Setting detail: DERMATOLOGY
End: 2020-11-25

## 2020-11-24 VITALS — RESPIRATION RATE: 16 BRPM | HEIGHT: 70 IN | WEIGHT: 172 LBS

## 2020-11-24 DIAGNOSIS — L63.8 OTHER ALOPECIA AREATA: ICD-10-CM

## 2020-11-24 PROCEDURE — OTHER PHOTO-DOCUMENTATION: OTHER

## 2020-11-24 PROCEDURE — OTHER INTRALESIONAL KENALOG: OTHER

## 2020-11-24 PROCEDURE — 11900 INJECT SKIN LESIONS </W 7: CPT

## 2020-11-24 PROCEDURE — OTHER COUNSELING: OTHER

## 2020-11-24 ASSESSMENT — LOCATION SIMPLE DESCRIPTION DERM
LOCATION SIMPLE: ANTERIOR SCALP
LOCATION SIMPLE: LEFT SCALP
LOCATION SIMPLE: LEFT FOREHEAD
LOCATION SIMPLE: RIGHT FOREHEAD
LOCATION SIMPLE: RIGHT SCALP

## 2020-11-24 ASSESSMENT — LOCATION DETAILED DESCRIPTION DERM
LOCATION DETAILED: LEFT CENTRAL FRONTAL SCALP
LOCATION DETAILED: RIGHT MEDIAL FRONTAL SCALP
LOCATION DETAILED: RIGHT CENTRAL FRONTAL SCALP
LOCATION DETAILED: MID-FRONTAL SCALP
LOCATION DETAILED: LEFT LATERAL FOREHEAD
LOCATION DETAILED: RIGHT INFERIOR LATERAL FOREHEAD

## 2020-11-24 ASSESSMENT — LOCATION ZONE DERM
LOCATION ZONE: FACE
LOCATION ZONE: SCALP

## 2020-11-24 NOTE — PROCEDURE: PHOTO-DOCUMENTATION
Detail Level: Zone
Photo Preface (Leave Blank If You Do Not Want): Photographs were obtained today to monitor

## 2020-11-24 NOTE — HPI: HAIR LOSS (ALOPECIA AREATA)
Is This A New Presentation, Or A Follow-Up?: Follow Up Alopecia Areata
Additional History: Is starting to see some hair growth

## 2020-11-24 NOTE — PROCEDURE: COUNSELING
Detail Level: Zone
Patient Specific Counseling (Will Not Stick From Patient To Patient): Due to increasing hair growth will extend the follow up to one mo

## 2020-12-30 ENCOUNTER — APPOINTMENT (OUTPATIENT)
Dept: URBAN - METROPOLITAN AREA CLINIC 260 | Age: 20
Setting detail: DERMATOLOGY
End: 2020-12-31

## 2020-12-30 ENCOUNTER — TRANSFERRED RECORDS (OUTPATIENT)
Dept: HEALTH INFORMATION MANAGEMENT | Facility: CLINIC | Age: 20
End: 2020-12-30

## 2020-12-30 VITALS — HEIGHT: 71 IN | WEIGHT: 175 LBS | RESPIRATION RATE: 15 BRPM

## 2020-12-30 DIAGNOSIS — L63.8 OTHER ALOPECIA AREATA: ICD-10-CM

## 2020-12-30 PROCEDURE — 96372 THER/PROPH/DIAG INJ SC/IM: CPT

## 2020-12-30 PROCEDURE — OTHER COUNSELING: OTHER

## 2020-12-30 PROCEDURE — OTHER INTRAMUSCULAR KENALOG: OTHER

## 2020-12-30 PROCEDURE — 99213 OFFICE O/P EST LOW 20 MIN: CPT | Mod: 25

## 2020-12-30 ASSESSMENT — LOCATION DETAILED DESCRIPTION DERM
LOCATION DETAILED: MID-FRONTAL SCALP
LOCATION DETAILED: LEFT BUTTOCK

## 2020-12-30 ASSESSMENT — LOCATION ZONE DERM
LOCATION ZONE: SCALP
LOCATION ZONE: TRUNK

## 2020-12-30 ASSESSMENT — LOCATION SIMPLE DESCRIPTION DERM
LOCATION SIMPLE: LEFT BUTTOCK
LOCATION SIMPLE: ANTERIOR SCALP

## 2020-12-30 NOTE — PROCEDURE: COUNSELING
Patient Specific Counseling (Will Not Stick From Patient To Patient): EF has started a referral for patient to go see a specialist at the [facility].\\nDisc that her hair loss is worsening. We also note some hypopigmentation from previous injections .\\n\\nDisc xeljanz as a possible option or a possible study. Will give her an intramuscular injection to see if this will help. In the meantime will refer her to the [facility] Patient Specific Counseling (Will Not Stick From Patient To Patient): EF has started a referral for patient to go see a specialist at the [facility].\\nDisc that her hair loss is worsening. We also note some hypopigmentation from previous injections .\\n\\nDisc xeljanz as a possible option or a possible study. Will give her an intramuscular injection to see if this will help. In the meantime will refer her to the [facility]

## 2021-01-18 ENCOUNTER — TRANSCRIBE ORDERS (OUTPATIENT)
Dept: OTHER | Age: 21
End: 2021-01-18

## 2021-01-18 DIAGNOSIS — L63.9 ALOPECIA AREATA: Primary | ICD-10-CM

## 2021-01-19 ENCOUNTER — TELEPHONE (OUTPATIENT)
Dept: DERMATOLOGY | Facility: CLINIC | Age: 21
End: 2021-01-19

## 2021-01-19 NOTE — TELEPHONE ENCOUNTER
[facility] Call Center    Phone Message    May a detailed message be left on voicemail: yes     Reason for Call: Appointment Intake    Referring Provider Name: JODIE Ramey at [facility]   Diagnosis and/or Symptoms: Referred by JODIE Ramey at [facility] for alopecia areata- pt has seen Dr Conklin in the past    Action Taken: Message routed to:  [facility] Dermatology    Travel Screening: Not Applicable

## 2021-01-22 NOTE — TELEPHONE ENCOUNTER
I called and left a VM at [facility] records dept asking for them to fax all records for Los.    ADRIANA Michele

## 2021-01-22 NOTE — TELEPHONE ENCOUNTER
FUTURE VISIT INFORMATION      FUTURE VISIT INFORMATION:    Date:     Time:     Location:   REFERRAL INFORMATION:    Referring provider:  Lorena Ramey     Referring providers clinic:  [facility]    Reason for visit/diagnosis  Alopecia Areata    RECORDS REQUESTED FROM:       Pending

## 2021-01-27 ENCOUNTER — APPOINTMENT (OUTPATIENT)
Dept: URBAN - METROPOLITAN AREA CLINIC 260 | Age: 21
Setting detail: DERMATOLOGY
End: 2021-01-27

## 2021-01-27 VITALS — WEIGHT: 175 LBS | RESPIRATION RATE: 15 BRPM | HEIGHT: 70 IN

## 2021-01-27 DIAGNOSIS — L63.8 OTHER ALOPECIA AREATA: ICD-10-CM

## 2021-01-27 PROCEDURE — OTHER PRESCRIPTION MEDICATION MANAGEMENT: OTHER

## 2021-01-27 PROCEDURE — OTHER INTRALESIONAL KENALOG: OTHER

## 2021-01-27 PROCEDURE — OTHER COUNSELING: OTHER

## 2021-01-27 PROCEDURE — 11900 INJECT SKIN LESIONS </W 7: CPT

## 2021-01-27 PROCEDURE — OTHER ADDITIONAL NOTES: OTHER

## 2021-01-27 PROCEDURE — 99213 OFFICE O/P EST LOW 20 MIN: CPT | Mod: 25

## 2021-01-27 ASSESSMENT — LOCATION SIMPLE DESCRIPTION DERM
LOCATION SIMPLE: ANTERIOR SCALP
LOCATION SIMPLE: SCALP

## 2021-01-27 ASSESSMENT — LOCATION DETAILED DESCRIPTION DERM
LOCATION DETAILED: MID-FRONTAL SCALP
LOCATION DETAILED: LEFT SUPERIOR PARIETAL SCALP
LOCATION DETAILED: LEFT CENTRAL PARIETAL SCALP

## 2021-01-27 ASSESSMENT — LOCATION ZONE DERM: LOCATION ZONE: SCALP

## 2021-01-27 NOTE — HPI: HAIR LOSS (ALOPECIA AREATA)
How Severe Is It?: moderate
Is This A New Presentation, Or A Follow-Up?: Follow Up Alopecia Areata
Additional History: Is flaring still. The IM kenalog helped a bit but she hasn’t heard anything from the [facility]

## 2021-01-27 NOTE — PROCEDURE: PRESCRIPTION MEDICATION MANAGEMENT
Detail Level: Zone
Render In Strict Bullet Format?: No
Continue Regimen: Kenalog injections and Clobetasol soln
Initiate Treatment: Rogaine

## 2021-01-27 NOTE — PROCEDURE: ADDITIONAL NOTES
Render Risk Assessment In Note?: no
Additional Notes: Discussed using Rogain the week when patient isn’t using Clobetasol.
Detail Level: Detailed

## 2021-01-28 NOTE — TELEPHONE ENCOUNTER
The pt's referring MD, Dr Ramey, called to ask the status of the referral. She states she faxed the med recs on 1.22.21 that were requested. Please review and contact the pt to schedule. The pt is basically bald not due to the diagnosis. If needed, call the MD's cell at [phone]. Thanks.

## 2021-02-04 NOTE — TELEPHONE ENCOUNTER
I called and left a VM for Dr. Ramey informing her that Lamina's referral has been received and is in process.    ADRIANA Michele

## 2021-02-10 ENCOUNTER — APPOINTMENT (OUTPATIENT)
Dept: URBAN - METROPOLITAN AREA CLINIC 260 | Age: 21
Setting detail: DERMATOLOGY
End: 2021-02-10

## 2021-02-10 VITALS — HEIGHT: 70 IN | WEIGHT: 175 LBS | RESPIRATION RATE: 18 BRPM

## 2021-02-10 VITALS — HEIGHT: 70 IN | WEIGHT: 175 LBS

## 2021-02-10 DIAGNOSIS — B36.0 PITYRIASIS VERSICOLOR: ICD-10-CM

## 2021-02-10 DIAGNOSIS — L63.8 OTHER ALOPECIA AREATA: ICD-10-CM

## 2021-02-10 PROCEDURE — OTHER PRESCRIPTION: OTHER

## 2021-02-10 PROCEDURE — 11901 INJECT SKIN LESIONS >7: CPT

## 2021-02-10 PROCEDURE — 99213 OFFICE O/P EST LOW 20 MIN: CPT | Mod: 25

## 2021-02-10 PROCEDURE — OTHER INTRALESIONAL KENALOG: OTHER

## 2021-02-10 PROCEDURE — OTHER COUNSELING: OTHER

## 2021-02-10 RX ORDER — KETOCONAZOLE 20 MG/G
2% CREAM TOPICAL TWICE DAILY
Qty: 1 | Refills: 2 | Status: ERX

## 2021-02-10 ASSESSMENT — LOCATION DETAILED DESCRIPTION DERM
LOCATION DETAILED: RIGHT CENTRAL FRONTAL SCALP
LOCATION DETAILED: POSTERIOR MID-PARIETAL SCALP
LOCATION DETAILED: RIGHT SUPERIOR OCCIPITAL SCALP
LOCATION DETAILED: RIGHT SUPERIOR LATERAL FOREHEAD
LOCATION DETAILED: LEFT SUPERIOR UPPER BACK
LOCATION DETAILED: LEFT MEDIAL SUPERIOR CHEST
LOCATION DETAILED: LEFT CENTRAL FRONTAL SCALP
LOCATION DETAILED: RIGHT CENTRAL PARIETAL SCALP
LOCATION DETAILED: NASAL TIP
LOCATION DETAILED: MEDIAL FRONTAL SCALP
LOCATION DETAILED: MEDIAL FRONTAL SCALP
LOCATION DETAILED: RIGHT MEDIAL FRONTAL SCALP
LOCATION DETAILED: LEFT MEDIAL FRONTAL SCALP
LOCATION DETAILED: LEFT SUPERIOR OCCIPITAL SCALP
LOCATION DETAILED: LEFT CENTRAL PARIETAL SCALP

## 2021-02-10 ASSESSMENT — LOCATION SIMPLE DESCRIPTION DERM
LOCATION SIMPLE: RIGHT OCCIPITAL SCALP
LOCATION SIMPLE: FRONTAL SCALP
LOCATION SIMPLE: NOSE
LOCATION SIMPLE: SCALP
LOCATION SIMPLE: FRONTAL SCALP
LOCATION SIMPLE: LEFT SCALP
LOCATION SIMPLE: RIGHT FOREHEAD
LOCATION SIMPLE: RIGHT SCALP
LOCATION SIMPLE: LEFT UPPER BACK
LOCATION SIMPLE: CHEST
LOCATION SIMPLE: LEFT OCCIPITAL SCALP
LOCATION SIMPLE: POSTERIOR SCALP

## 2021-02-10 ASSESSMENT — LOCATION ZONE DERM
LOCATION ZONE: FACE
LOCATION ZONE: TRUNK
LOCATION ZONE: SCALP
LOCATION ZONE: NOSE
LOCATION ZONE: SCALP

## 2021-03-10 ENCOUNTER — APPOINTMENT (OUTPATIENT)
Dept: URBAN - METROPOLITAN AREA CLINIC 260 | Age: 21
Setting detail: DERMATOLOGY
End: 2021-03-11

## 2021-03-10 DIAGNOSIS — L63.8 OTHER ALOPECIA AREATA: ICD-10-CM

## 2021-03-10 PROCEDURE — OTHER PRESCRIPTION: OTHER

## 2021-03-10 PROCEDURE — OTHER COUNSELING: OTHER

## 2021-03-10 PROCEDURE — OTHER INTRALESIONAL KENALOG: OTHER

## 2021-03-10 PROCEDURE — OTHER ADDITIONAL NOTES: OTHER

## 2021-03-10 PROCEDURE — 11900 INJECT SKIN LESIONS </W 7: CPT

## 2021-03-10 PROCEDURE — OTHER PRESCRIPTION MEDICATION MANAGEMENT: OTHER

## 2021-03-10 RX ORDER — TACROLIMUS 1 MG/G
0.1% OINTMENT TOPICAL
Qty: 1 | Refills: 0 | Status: ERX | COMMUNITY
Start: 2021-03-10

## 2021-03-10 ASSESSMENT — LOCATION SIMPLE DESCRIPTION DERM
LOCATION SIMPLE: FRONTAL SCALP
LOCATION SIMPLE: ANTERIOR SCALP
LOCATION SIMPLE: NOSE
LOCATION SIMPLE: RIGHT SCALP
LOCATION SIMPLE: FRONTAL SCALP

## 2021-03-10 ASSESSMENT — LOCATION DETAILED DESCRIPTION DERM
LOCATION DETAILED: RIGHT MEDIAL FRONTAL SCALP
LOCATION DETAILED: MEDIAL FRONTAL SCALP
LOCATION DETAILED: MID-FRONTAL SCALP
LOCATION DETAILED: MEDIAL FRONTAL SCALP
LOCATION DETAILED: NASAL TIP

## 2021-03-10 ASSESSMENT — LOCATION ZONE DERM
LOCATION ZONE: NOSE
LOCATION ZONE: SCALP
LOCATION ZONE: SCALP

## 2021-03-10 NOTE — PROCEDURE: ADDITIONAL NOTES
Render Risk Assessment In Note?: no
Additional Notes: Multiple areas throughout the scalp were injected.
Detail Level: Detailed

## 2021-03-10 NOTE — PROCEDURE: PRESCRIPTION MEDICATION MANAGEMENT
Detail Level: Detailed
Initiate Treatment: Tacrolimus 0.1% ointment QD every other werk
Render In Strict Bullet Format?: No
Plan: She will talk to her insurance company about PRP injections however she was not aware of it being covered by insurance. Discussed that we may refer her to natanael if covered. Also discussed how Covid may be related to alopecia areata flares. She’s not aware of any history of Covid but her dad did just have it four weeks ago.
Continue Regimen: Clobetasol 0.05% solution QD every other week

## 2021-03-16 NOTE — PROCEDURE: COUNSELING
Azithromycin Counseling:  I discussed with the patient the risks of azithromycin including but not limited to GI upset, allergic reaction, drug rash, diarrhea, and yeast infections. Ftsg Text: We discussed various closure modalities with the patient, including healing by second intention, primary closure, skin graft and various flaps.  The location and configuration of the defect indicated that a Full Thickness Skin Graft would result in the least disturbance of the position and function of the surrounding anatomic structures, and provide the best result.  The technique, its benefits, alternatives and risks were discussed with the patient.  The patient underwent the procedure as follows: The patient was positioned supine on the operating room table.  The area of the defect and the surrounding skin were anesthetized with buffered 1% lidocaine with epinephrine.  The area was washed with chlorhexidine.  Sterile drapes were applied. \\n\\nThe defect edges were debeveled with a #15 scalpel blade.  Using a sterile surgical marker, the primary defect shape was transferred to the donor site. The area thus outlined was incised with a #15 scalpel blade.  The harvested graft was then trimmed of adipose tissue until only dermis and epidermis was left.  The skin margins of the secondary defect were undermined to an appropriate distance in all directions utilizing iris scissors.  The secondary defect was closed with an intermediate layered closure.  The skin graft was then placed and sewn in the primary defect and oriented appropriately with running fast absorbing sutures.

## 2021-03-23 ENCOUNTER — OFFICE VISIT (OUTPATIENT)
Dept: DERMATOLOGY | Facility: CLINIC | Age: 21
End: 2021-03-23
Payer: COMMERCIAL

## 2021-03-23 DIAGNOSIS — L63.9 ALOPECIA AREATA: Primary | ICD-10-CM

## 2021-03-23 DIAGNOSIS — L63.9 ALOPECIA AREATA: ICD-10-CM

## 2021-03-23 LAB
FERRITIN SERPL-MCNC: 48 NG/ML (ref 12–150)
IRON SATN MFR SERPL: 19 % (ref 15–46)
IRON SERPL-MCNC: 78 UG/DL (ref 35–180)
TIBC SERPL-MCNC: 420 UG/DL (ref 240–430)
TSH SERPL DL<=0.005 MIU/L-ACNC: 0.7 MU/L (ref 0.4–4)

## 2021-03-23 PROCEDURE — 83540 ASSAY OF IRON: CPT | Performed by: PATHOLOGY

## 2021-03-23 PROCEDURE — 99000 SPECIMEN HANDLING OFFICE-LAB: CPT | Performed by: PATHOLOGY

## 2021-03-23 PROCEDURE — 86376 MICROSOMAL ANTIBODY EACH: CPT | Mod: 90 | Performed by: PATHOLOGY

## 2021-03-23 PROCEDURE — 99203 OFFICE O/P NEW LOW 30 MIN: CPT | Performed by: DERMATOLOGY

## 2021-03-23 PROCEDURE — 84443 ASSAY THYROID STIM HORMONE: CPT | Performed by: PATHOLOGY

## 2021-03-23 PROCEDURE — 82728 ASSAY OF FERRITIN: CPT | Performed by: PATHOLOGY

## 2021-03-23 PROCEDURE — 83550 IRON BINDING TEST: CPT | Performed by: PATHOLOGY

## 2021-03-23 PROCEDURE — 36415 COLL VENOUS BLD VENIPUNCTURE: CPT | Performed by: PATHOLOGY

## 2021-03-23 RX ORDER — CLOBETASOL PROPIONATE 0.5 MG/ML
SOLUTION TOPICAL 2 TIMES DAILY
COMMUNITY

## 2021-03-23 RX ORDER — KETOCONAZOLE 20 MG/G
CREAM TOPICAL PRN
COMMUNITY

## 2021-03-23 RX ORDER — TACROLIMUS 1 MG/G
OINTMENT TOPICAL 2 TIMES DAILY
COMMUNITY

## 2021-03-23 RX ORDER — FLUOCINOLONE ACETONIDE 0.11 MG/ML
OIL TOPICAL
Qty: 60 ML | Refills: 3 | Status: SHIPPED | OUTPATIENT
Start: 2021-03-23

## 2021-03-23 ASSESSMENT — PAIN SCALES - GENERAL: PAINLEVEL: NO PAIN (0)

## 2021-03-23 NOTE — LETTER
3/23/2021       RE: Symone Ferrari  [address]  [facility] [address]     Dear Colleague,    Thank you for referring your patient, Symone Ferrari, to the [facility] [place] at [facility]. Please see a copy of my visit note below.    [facility] Dermatology Note  Encounter Date: Mar 23, 2021  Office Visit     Dermatology Problem List:  1. Alopecia areata- Since 8 years. Was doing well on clobetasol and monthly ILK with outside dermatologist, recently not responding so came to [facility] for specialist appt.  - 3/23/2021: checking labs, adding Rogaine 5% and fluocinolone oil, starting photobiomodulation  ____________________________________________    Assessment & Plan:    1. Alopecia areata- for many years. Was doing well on clobetasol and monthly ILK with outside dermatologist, recently not responding so came to [facility] for specialist appt. Will take a break from steroids on scalp due to skin atrophy.  - Labs today: ferratin, TSH   -  Photo biomodulation 3x/week  - Rogaine 5% followed by fluocinolone oil.  - HairMetrix today  - Will have research team call for more information about trials    Follow-up: 4 week(s) in-person, or earlier for new or changing lesions    Staff and Medical Student:     Mikie Florentino, MS4    Staff Physician:  I was present with the medical student who participated in the service and in the documentation of the note. I have verified the history and personally performed the physical exam and medical decision making. I agree with the assessment and plan of care as documented in the note.       Autumn Conklin MD  Professor and Chair  Department of Dermatology  [facility]: Phone: [phone], Fax:[phone]  [facility]: Phone: [phone], Fax:  "[phone]        ____________________________________________    CC: Hair Loss (Los is here today in order to discuss hair loss. She states that she has noticed continual hair loss over the past 8 years. She reports that she itches \"prior to losing the hair.\" She does not report any pain or burning sensation. )    HPI:  Ms. Symone Ferrari is a(n) 20 year old female who presents today as a new patient for hair loss. She has had alopecia areata for 8 years, starting in high school. She has had flares over the years but is in the middle of the worst.  She sees Dr. Tenisha Ramey at [facility], who she sees once a month for ILK. She was last seen two weeks ago. She had not been responding for a few months which was unusual. They had moved to injections every 2 weeks to try to kickstart growth, without success. Tried  IM injection at the last visit, and patient is unsure if there was a response. Her dermatologist referred her to the [facility]. She last had labs drawn with her primary care provider last September when her vitamin D level was low. She has light periods but is on hormonal birth control. No history of hair growth on face. No family history of hair loss or DMT1, RA, Crohn's, Celiac.    - Current tx: Clobetasol twice a day every other week. On off weeks, uses Protopic 0.1%. Uses biotin-containing shampoo. Uses scalp oils.  - Scalp pain: No.  - Scalp burning: No.  - Scalp itching: Feels this prior to loosing hair.  - Eyebrow or eyelash changes: No.  - Nail changes: Feels they are thinner. Takes Biotin, Up and Up brand from [employer].  - Overall course: Had been responding to ILK injections, but became worse recently. Thinks it is getting a litle better but still not back to baseline.    Patient is otherwise feeling well, without additional skin concerns.    Labs:  Ordered TPO, TSH, iron profile and ferritin    Physical Exam:  Vitals: There were no vitals taken for this visit.  SKIN: " Focused examination of hair, face and scalp was performed.  - 35% hair loss - percent of scalp with no hair noted  - Eyebrows and eyelashes within normal limits  - No nail pitting  - No perifollicular scale or erythema noted                             Medications:  Current Outpatient Medications   Medication     clobetasol (TEMOVATE) 0.05 % external solution     ketoconazole (NIZORAL) 2 % external cream     tacrolimus (PROTOPIC) 0.1 % external ointment     tretinoin (RETIN-A) 0.01 % gel     FLUOCINOLONE ACETONIDE SCALP 0.01 % OIL     No current facility-administered medications for this visit.       Past Medical History:   Patient Active Problem List   Diagnosis     Alopecia areata     Autoimmune disease (H)     No past medical history on file.     CC No referring provider defined for this encounter. on close of this encounter.        Again, thank you for allowing me to participate in the care of your patient.      Sincerely,    Autumn Conklin MD

## 2021-03-23 NOTE — PROGRESS NOTES
"[facility] Dermatology Note  Encounter Date: Mar 23, 2021  Office Visit     Dermatology Problem List:  1. Alopecia areata- Since 8 years. Was doing well on clobetasol and monthly ILK with outside dermatologist, recently not responding so came to [facility] for specialist appt.  - 3/23/2021: checking labs, adding Rogaine 5% and fluocinolone oil, starting photobiomodulation  ____________________________________________    Assessment & Plan:    1. Alopecia areata- for many years. Was doing well on clobetasol and monthly ILK with outside dermatologist, recently not responding so came to [facility] for specialist appt. Will take a break from steroids on scalp due to skin atrophy.  - Labs today: ferratin, TSH   -  Photo biomodulation 3x/week  - Rogaine 5% followed by fluocinolone oil.  - HairMetrix today  - Will have research team call for more information about trials    Follow-up: 4 week(s) in-person, or earlier for new or changing lesions    Staff and Medical Student:     Mikie Florentino, MS4    Staff Physician:  I was present with the medical student who participated in the service and in the documentation of the note. I have verified the history and personally performed the physical exam and medical decision making. I agree with the assessment and plan of care as documented in the note.       Autumn Conklin MD  Professor and Chair  Department of Dermatology  [facility]: Phone: [phone], Fax:[phone]  [facility]: Phone: [phone], Fax: [phone]        ____________________________________________    CC: Hair Loss (Los is here today in order to discuss hair loss. She states that she has noticed continual hair loss over the past 8 years. She reports that she itches \"prior to losing the hair.\" She does not report any pain or burning sensation. )    HPI:  Ms. Symone GARCÍA Lamina is a(n) " 20 year old female who presents today as a new patient for hair loss. She has had alopecia areata for 8 years, starting in high school. She has had flares over the years but is in the middle of the worst.  She sees Dr. Tenisha Ramey at [facility], who she sees once a month for ILK. She was last seen two weeks ago. She had not been responding for a few months which was unusual. They had moved to injections every 2 weeks to try to kickstart growth, without success. Tried  IM injection at the last visit, and patient is unsure if there was a response. Her dermatologist referred her to the [facility]. She last had labs drawn with her primary care provider last September when her vitamin D level was low. She has light periods but is on hormonal birth control. No history of hair growth on face. No family history of hair loss or DMT1, RA, Crohn's, Celiac.    - Current tx: Clobetasol twice a day every other week. On off weeks, uses Protopic 0.1%. Uses biotin-containing shampoo. Uses scalp oils.  - Scalp pain: No.  - Scalp burning: No.  - Scalp itching: Feels this prior to loosing hair.  - Eyebrow or eyelash changes: No.  - Nail changes: Feels they are thinner. Takes Biotin, Up and Up brand from [employer].  - Overall course: Had been responding to ILK injections, but became worse recently. Thinks it is getting a litle better but still not back to baseline.    Patient is otherwise feeling well, without additional skin concerns.    Labs:  Ordered TPO, TSH, iron profile and ferritin    Physical Exam:  Vitals: There were no vitals taken for this visit.  SKIN: Focused examination of hair, face and scalp was performed.  - 35% hair loss - percent of scalp with no hair noted  - Eyebrows and eyelashes within normal limits  - No nail pitting  - No perifollicular scale or erythema noted                             Medications:  Current Outpatient Medications   Medication     clobetasol (TEMOVATE) 0.05 % external solution      ketoconazole (NIZORAL) 2 % external cream     tacrolimus (PROTOPIC) 0.1 % external ointment     tretinoin (RETIN-A) 0.01 % gel     FLUOCINOLONE ACETONIDE SCALP 0.01 % OIL     No current facility-administered medications for this visit.       Past Medical History:   Patient Active Problem List   Diagnosis     Alopecia areata     Autoimmune disease (H)     No past medical history on file.     CC No referring provider defined for this encounter. on close of this encounter.

## 2021-03-23 NOTE — PATIENT INSTRUCTIONS
- Lasercomb 3x/week - purchase online from [employer]  - follow manufacturers'   - Rogaine 5% followed by fluocinolone oil, OK to use Rogaine foam or liquid  Labs today

## 2021-03-23 NOTE — NURSING NOTE
"Dermatology Rooming Note    Symone Ferrari's goals for this visit include:   Chief Complaint   Patient presents with     Hair Loss     Los is here today in order to discuss hair loss. She states that she has noticed continual hair loss over the past 8 years. She reports that she itches \"prior to losing the hair.\" She does not report any pain or burning sensation.      Jarod Long, EMT    "

## 2021-03-24 LAB — THYROPEROXIDASE AB SERPL-ACNC: <10 IU/ML

## 2021-04-20 ENCOUNTER — OFFICE VISIT (OUTPATIENT)
Dept: DERMATOLOGY | Facility: CLINIC | Age: 21
End: 2021-04-20
Payer: COMMERCIAL

## 2021-04-20 DIAGNOSIS — L90.9 SKIN ATROPHY: ICD-10-CM

## 2021-04-20 DIAGNOSIS — M35.9 AUTOIMMUNE DISEASE (H): ICD-10-CM

## 2021-04-20 DIAGNOSIS — L63.9 ALOPECIA AREATA: Primary | ICD-10-CM

## 2021-04-20 PROCEDURE — 11901 INJECT SKIN LESIONS >7: CPT | Performed by: DERMATOLOGY

## 2021-04-20 ASSESSMENT — PAIN SCALES - GENERAL: PAINLEVEL: NO PAIN (0)

## 2021-04-20 NOTE — NURSING NOTE
Chief Complaint   Patient presents with     Hair Loss     pt jon she is here for a follow up on hairloss      Diamante Cui MA

## 2021-04-20 NOTE — PROGRESS NOTES
[facility] Dermatology Note    Dermatology Problem List:  1. Alopecia areata- Since 8 years. Was doing well on clobetasol and monthly ILK with outside dermatologist, recently not responding so came to [facility] for specialist appt.  - 3/23/2021: checking labs, adding Rogaine 5% and fluocinolone oil, starting photobiomodulation  - 4/20/2021: ILK injections, continue minoxidil, fluocinolone oil, photobiomodulation      Encounter Date: Apr 20, 2021    CC:   Chief Complaint   Patient presents with     Hair Loss     pt jon she is here for a follow up on hairloss        History of Present Illness:  Ms. Symone Ferrari is a 21 year old female who presents as a follow-up patient for hair loss. She feels like her hair loss is signficantly improved over previously, with new fibers over the R side of the scalp as well as over the crown, especially anteriorly. She feels that the area over her L ear has relatively lagged behind the other areas of her scalp in terms of hair regrowth. She also notes occasional tingling over the scalp, especially with psychosocial stress, though she notes no recent symptoms. She has been using minoxidil 5% daily, fluocinolone oil every other day, and photobiomodulation 3 times weekly. She notes no side effects from current treatment. She states that she feels her nails are still brittle, and she states that she has been applying a Jolene Olsen nail growth treatment to her nails.    - Last seen in clinic on 3/23/21  - Current tx: Minoxidil 5% foam, fluocinolone acetonide 0.01% oil, photobiomodulation 3x/week  - Scalp pain: No  - Scalp burning: No  - Scalp itching: No  - Eyebrow or eyelash changes: No  - Nail changes: No  - Overall course: Significantly improved over previously    Previous labs including ferritin anti-TPO ellie, and TSH were reviewed and were within normal limits.    No other concerns today and in general state of health.     Past Medical History:   Patient Active  Problem List   Diagnosis     Alopecia areata     Autoimmune disease (H)     No past medical history on file.  No past surgical history on file.    Social History:   reports that she has never smoked. She has never used smokeless tobacco.    Family History:  Family History   Problem Relation Age of Onset     Cancer No family hx of         no skin cancer       Medications:  Current Outpatient Medications   Medication Sig Dispense Refill     clobetasol (TEMOVATE) 0.05 % external solution Apply topically 2 times daily       Fluocinolone Acetonide Scalp (DERMA-SMOOTHE/FS SCALP) 0.01 % OIL oil 2cc to scalp every other day AFTER application of Rogaine 5%, use laser comb on other days 3 times per week 60 mL 3     ketoconazole (NIZORAL) 2 % external cream Apply topically as needed for itching       tacrolimus (PROTOPIC) 0.1 % external ointment Apply topically 2 times daily       tretinoin (RETIN-A) 0.01 % gel Apply topically At Bedtime       FLUOCINOLONE ACETONIDE SCALP 0.01 % OIL Externally apply 1-2 capfuls topically three times a week (Patient not taking: Reported on 4/20/2021) 118 mL 3      No Known Allergies    Review of Systems:  -Constitutional: Otherwise doing well.   -HEENT: Patient denies nonhealing oral sores.  -Skin: As above in HPI. No additional skin concerns.    Physical exam:  Vitals: There were no vitals taken for this visit.  GEN: Well developed, well-nourished, in no acute distress, in a pleasant mood.    SKIN: Focused examination of the hair, scalp, and nails was performed.  - Numerous short regrowth fibers over crown, sides, and occiput of scalp, with relatively less regrowth over the L ear as compared to other areas.  - Atrophy of skin on  R occipital scalp  - Hair pull test negative  - Eyelashes and eyebrows normal density  - Nails no pitting or dystrophy  - In comparison to prior photographs, significant improvement of hair regrowth over previously      Impression/Plan:    #. Alopecia Areata-  significantly improved over previously  - Continue minoxidil 5% foam daily followed by fluocinolone oil 0.01% every other day  - Continue photobiomodulation 3 times weekly  - s/p ILK injections 4/20/21, see procedure note below     - After verbal consent and discussion of risks including but not limited to atrophy, pain, and bruising,  time out was performed, 0.8 total cc of Kenalog 10 mg/cc was injected into 8 sites on the L temporal and R occipital scalp. The patient tolerated the procedure well and left the Dermatology Clinic in good condition.    Follow-up      Dr. Conklin staffed the patient.    Staff Involved:  Scribe/Fellow (Jacky Olvera)/Staff(as above)    Staff Physician:  I was present with the medical student who participated in the service and in the documentation of the note. I have verified the history and personally performed the physical exam and medical decision making. I agree with the assessment and plan of care as documented in the note.  ILK injections were primarily done by me.      Autumn Conklin MD  Professor and Chair  Department of Dermatology  [facility]: Phone: [phone], Fax:[phone]  [facility]: Phone: [phone], Fax: [phone]

## 2021-04-20 NOTE — NURSING NOTE
Drug Administration Record    Prior to injection, verified patient identity using patient's name and date of birth.  Due to injection administration, patient instructed to remain in clinic for 15 minutes  afterwards, and to report any adverse reaction to me immediately.    Drug Name: triamcinolone acetonide(kenalog)  Dose: 1mL of triamcinolone 10mg/mL, 10mg dose  Route administered: ID  NDC #: Kenalog-10 (2223-7388-10)  Amount of waste(mL):4mL  Reason for waste: Multi dose vial    LOT #: [id number]  SITE: scalp  : [employer]  EXPIRATION DATE: 6/2022    ADRIANA Soares

## 2021-04-20 NOTE — LETTER
4/20/2021       RE: Symone Ferrari  [address]  [facility] [address]     Dear Colleague,    Thank you for referring your patient, Symone Ferrari, to the [facility]MAT[facility] [place] at [facility]. Please see a copy of my visit note below.    [facility] Dermatology Note    Dermatology Problem List:  1. Alopecia areata- Since 8 years. Was doing well on clobetasol and monthly ILK with outside dermatologist, recently not responding so came to [facility] for specialist appt.  - 3/23/2021: checking labs, adding Rogaine 5% and fluocinolone oil, starting photobiomodulation  - 4/20/2021: ILK injections, continue minoxidil, fluocinolone oil, photobiomodulation      Encounter Date: Apr 20, 2021    CC:   Chief Complaint   Patient presents with     Hair Loss     pt sates she is here for a follow up on hairloss        History of Present Illness:  Ms. Symone Ferrari is a 21 year old female who presents as a follow-up patient for hair loss. She feels like her hair loss is signficantly improved over previously, with new fibers over the R side of the scalp as well as over the crown, especially anteriorly. She feels that the area over her L ear has relatively lagged behind the other areas of her scalp in terms of hair regrowth. She also notes occasional tingling over the scalp, especially with psychosocial stress, though she notes no recent symptoms. She has been using minoxidil 5% daily, fluocinolone oil every other day, and photobiomodulation 3 times weekly. She notes no side effects from current treatment. She states that she feels her nails are still brittle, and she states that she has been applying a Jolene Olsen nail growth treatment to her nails.    - Last seen in clinic on 3/23/21  - Current tx: Minoxidil 5% foam, fluocinolone acetonide 0.01% oil, photobiomodulation 3x/week  - Scalp pain: No  - Scalp burning: No  - Scalp itching:  No  - Eyebrow or eyelash changes: No  - Nail changes: No  - Overall course: Significantly improved over previously    Previous labs including ferritin anti-TPO ellie, and TSH were reviewed and were within normal limits.    No other concerns today and in general state of health.     Past Medical History:   Patient Active Problem List   Diagnosis     Alopecia areata     Autoimmune disease (H)     No past medical history on file.  No past surgical history on file.    Social History:   reports that she has never smoked. She has never used smokeless tobacco.    Family History:  Family History   Problem Relation Age of Onset     Cancer No family hx of         no skin cancer       Medications:  Current Outpatient Medications   Medication Sig Dispense Refill     clobetasol (TEMOVATE) 0.05 % external solution Apply topically 2 times daily       Fluocinolone Acetonide Scalp (DERMA-SMOOTHE/FS SCALP) 0.01 % OIL oil 2cc to scalp every other day AFTER application of Rogaine 5%, use laser comb on other days 3 times per week 60 mL 3     ketoconazole (NIZORAL) 2 % external cream Apply topically as needed for itching       tacrolimus (PROTOPIC) 0.1 % external ointment Apply topically 2 times daily       tretinoin (RETIN-A) 0.01 % gel Apply topically At Bedtime       FLUOCINOLONE ACETONIDE SCALP 0.01 % OIL Externally apply 1-2 capfuls topically three times a week (Patient not taking: Reported on 4/20/2021) 118 mL 3      No Known Allergies    Review of Systems:  -Constitutional: Otherwise doing well.   -HEENT: Patient denies nonhealing oral sores.  -Skin: As above in HPI. No additional skin concerns.    Physical exam:  Vitals: There were no vitals taken for this visit.  GEN: Well developed, well-nourished, in no acute distress, in a pleasant mood.    SKIN: Focused examination of the hair, scalp, and nails was performed.  - Numerous short regrowth fibers over crown, sides, and occiput of scalp, with relatively less regrowth over the L ear  as compared to other areas.  - Atrophy of skin on  R occipital scalp  - Hair pull test negative  - Eyelashes and eyebrows normal density  - Nails no pitting or dystrophy  - In comparison to prior photographs, significant improvement of hair regrowth over previously      Impression/Plan:    #. Alopecia Areata- significantly improved over previously  - Continue minoxidil 5% foam daily followed by fluocinolone oil 0.01% every other day  - Continue photobiomodulation 3 times weekly  - s/p ILK injections 4/20/21, see procedure note below     - After verbal consent and discussion of risks including but not limited to atrophy, pain, and bruising,  time out was performed, 0.8 total cc of Kenalog 10 mg/cc was injected into 8 sites on the L temporal and R occipital scalp. The patient tolerated the procedure well and left the Dermatology Clinic in good condition.    Follow-up      Dr. Conklin staffed the patient.    Staff Involved:  Scribe/Fellow (Jacky Olvera)/Staff(as above)    Staff Physician:  I was present with the medical student who participated in the service and in the documentation of the note. I have verified the history and personally performed the physical exam and medical decision making. I agree with the assessment and plan of care as documented in the note.  ILK injections were primarily done by me.      Autumn Conklin MD  Professor and Chair  Department of Dermatology  [facility]: Phone: [phone], Fax:[phone]  [facility]: Phone: [phone], Fax: [phone]

## 2021-04-24 ENCOUNTER — HEALTH MAINTENANCE LETTER (OUTPATIENT)
Age: 21
End: 2021-04-24

## 2021-05-04 ENCOUNTER — APPOINTMENT (OUTPATIENT)
Dept: URBAN - METROPOLITAN AREA CLINIC 260 | Age: 21
Setting detail: DERMATOLOGY
End: 2021-05-05

## 2021-05-04 VITALS — WEIGHT: 170 LBS | RESPIRATION RATE: 18 BRPM

## 2021-05-04 DIAGNOSIS — L63.8 OTHER ALOPECIA AREATA: ICD-10-CM

## 2021-05-04 PROCEDURE — 99213 OFFICE O/P EST LOW 20 MIN: CPT | Mod: 25

## 2021-05-04 PROCEDURE — OTHER INTRALESIONAL KENALOG: OTHER

## 2021-05-04 PROCEDURE — OTHER PRESCRIPTION MEDICATION MANAGEMENT: OTHER

## 2021-05-04 PROCEDURE — OTHER COUNSELING: OTHER

## 2021-05-04 PROCEDURE — 11901 INJECT SKIN LESIONS >7: CPT

## 2021-05-04 ASSESSMENT — LOCATION DETAILED DESCRIPTION DERM
LOCATION DETAILED: RIGHT MEDIAL FRONTAL SCALP
LOCATION DETAILED: LEFT SUPERIOR POSTERIOR NECK
LOCATION DETAILED: LEFT INFERIOR OCCIPITAL SCALP
LOCATION DETAILED: RIGHT INFERIOR OCCIPITAL SCALP
LOCATION DETAILED: LEFT CENTRAL PARIETAL SCALP
LOCATION DETAILED: LEFT CENTRAL FRONTAL SCALP
LOCATION DETAILED: LEFT INFERIOR PARIETAL SCALP
LOCATION DETAILED: MID-OCCIPITAL SCALP
LOCATION DETAILED: NASAL TIP
LOCATION DETAILED: MEDIAL FRONTAL SCALP

## 2021-05-04 ASSESSMENT — LOCATION ZONE DERM
LOCATION ZONE: SCALP
LOCATION ZONE: NOSE
LOCATION ZONE: SCALP
LOCATION ZONE: NECK

## 2021-05-04 ASSESSMENT — LOCATION SIMPLE DESCRIPTION DERM
LOCATION SIMPLE: SCALP
LOCATION SIMPLE: POSTERIOR SCALP
LOCATION SIMPLE: FRONTAL SCALP
LOCATION SIMPLE: RIGHT SCALP
LOCATION SIMPLE: NOSE
LOCATION SIMPLE: POSTERIOR NECK

## 2021-05-04 NOTE — HPI: HAIR LOSS (ALOPECIA AREATA)
How Severe Is It?: moderate
Is This A New Presentation, Or A Follow-Up?: Follow Up Alopecia Areata
Additional History: Using fluocinolone oil qod \\nRogaine 2% qd\\nLaser hair comb three times a week\\Rosie balderas March 23 and then on April 20 for a follow up\\[facility] bloodwork done, thyroid and iron were good\\nShe did switch from night shift to evening shift and wonders if that helped. She has been stressed more recently.

## 2021-05-04 NOTE — PROCEDURE: PRESCRIPTION MEDICATION MANAGEMENT
Discontinue Regimen: Tacrolimus ointment
Continue Regimen: Minoxidil 2% qd\\nBiotin oil with laser hair comb 3 times weekly\\nFluocinolone scalp oil qod
Detail Level: Detailed
Render In Strict Bullet Format?: No
Plan: The left temple and back occipital area were injected today as this is a more stubborn area

## 2021-05-04 NOTE — PROCEDURE: INTRALESIONAL KENALOG
Detail Level: Detailed
Kenalog Preparation: Kenalog
X Size Of Lesion In Cm (Optional): 0
Administered By (Optional): NII
Concentration Of Solution Injected (Mg/Ml): 10.0
Validate Note Data When Using Inventory: Yes
Include Z78.9 (Other Specified Conditions Influencing Health Status) As An Associated Diagnosis?: No
Medical Necessity Clause: This procedure was medically necessary because the lesions that were treated were:
Total Volume Injected (Ccs- Only Use Numbers And Decimals): .9
Consent: The risks of atrophy were reviewed with the patient.

## 2021-08-24 ENCOUNTER — APPOINTMENT (OUTPATIENT)
Dept: URBAN - METROPOLITAN AREA CLINIC 260 | Age: 21
Setting detail: DERMATOLOGY
End: 2021-08-24

## 2021-08-24 VITALS — RESPIRATION RATE: 15 BRPM | WEIGHT: 175 LBS | HEIGHT: 70 IN

## 2021-08-24 DIAGNOSIS — L63.8 OTHER ALOPECIA AREATA: ICD-10-CM

## 2021-08-24 PROCEDURE — OTHER PRESCRIPTION MEDICATION MANAGEMENT: OTHER

## 2021-08-24 PROCEDURE — OTHER INTRALESIONAL KENALOG: OTHER

## 2021-08-24 PROCEDURE — 11901 INJECT SKIN LESIONS >7: CPT

## 2021-08-24 PROCEDURE — 99213 OFFICE O/P EST LOW 20 MIN: CPT | Mod: 25

## 2021-08-24 PROCEDURE — OTHER COUNSELING: OTHER

## 2021-08-24 ASSESSMENT — LOCATION DETAILED DESCRIPTION DERM
LOCATION DETAILED: NASAL TIP
LOCATION DETAILED: RIGHT SUPERIOR FOREHEAD
LOCATION DETAILED: RIGHT FOREHEAD
LOCATION DETAILED: LEFT CENTRAL FRONTAL SCALP
LOCATION DETAILED: LEFT SUPERIOR FRONTAL SCALP
LOCATION DETAILED: LEFT INFERIOR PARIETAL SCALP
LOCATION DETAILED: RIGHT CENTRAL FRONTAL SCALP
LOCATION DETAILED: RIGHT POSTAURICULAR SKIN
LOCATION DETAILED: RIGHT SUPERIOR OCCIPITAL SCALP
LOCATION DETAILED: LEFT CENTRAL PARIETAL SCALP
LOCATION DETAILED: RIGHT SUPERIOR FRONTAL SCALP
LOCATION DETAILED: RIGHT MEDIAL FRONTAL SCALP
LOCATION DETAILED: RIGHT SUPERIOR POSTAURICULAR SKIN
LOCATION DETAILED: RIGHT OCCIPITAL SCALP
LOCATION DETAILED: RIGHT INFERIOR OCCIPITAL SCALP
LOCATION DETAILED: RIGHT LATERAL FOREHEAD
LOCATION DETAILED: MEDIAL FRONTAL SCALP

## 2021-08-24 ASSESSMENT — LOCATION SIMPLE DESCRIPTION DERM
LOCATION SIMPLE: FRONTAL SCALP
LOCATION SIMPLE: RIGHT FOREHEAD
LOCATION SIMPLE: SCALP
LOCATION SIMPLE: NOSE
LOCATION SIMPLE: RIGHT SCALP
LOCATION SIMPLE: POSTERIOR SCALP
LOCATION SIMPLE: RIGHT OCCIPITAL SCALP

## 2021-08-24 ASSESSMENT — LOCATION ZONE DERM
LOCATION ZONE: FACE
LOCATION ZONE: NOSE
LOCATION ZONE: SCALP
LOCATION ZONE: SCALP

## 2021-08-24 NOTE — PROCEDURE: PRESCRIPTION MEDICATION MANAGEMENT
Plan: The left temple and back occipital area were injected today as this is a more stubborn area
Continue Regimen: Minoxidil 2% qd\\nBiotin oil with laser hair comb 3 times weekly\\nFluocinolone scalp oil qod
Detail Level: Detailed
Render In Strict Bullet Format?: No

## 2021-08-24 NOTE — HPI: HAIR LOSS (ALOPECIA AREATA)
How Severe Is It?: moderate
Is This A New Presentation, Or A Follow-Up?: Follow Up Alopecia Areata
Additional History: Started flaring a week ago

## 2021-10-01 ENCOUNTER — RX ONLY (RX ONLY)
Age: 21
End: 2021-10-01

## 2021-10-01 RX ORDER — DROSPIRENONE AND ETHINYL ESTRADIOL 0.02-3(28)
KIT ORAL QD
Qty: 84 | Refills: 4 | Status: ERX | COMMUNITY
Start: 2021-09-30

## 2021-10-03 ENCOUNTER — HEALTH MAINTENANCE LETTER (OUTPATIENT)
Age: 21
End: 2021-10-03

## 2021-10-08 ENCOUNTER — APPOINTMENT (OUTPATIENT)
Dept: URBAN - METROPOLITAN AREA CLINIC 260 | Age: 21
Setting detail: DERMATOLOGY
End: 2021-10-09

## 2021-10-08 DIAGNOSIS — L63.8 OTHER ALOPECIA AREATA: ICD-10-CM

## 2021-10-08 PROCEDURE — OTHER INTRALESIONAL KENALOG: OTHER

## 2021-10-08 PROCEDURE — 11901 INJECT SKIN LESIONS >7: CPT

## 2021-10-08 PROCEDURE — OTHER PRESCRIPTION MEDICATION MANAGEMENT: OTHER

## 2021-10-08 ASSESSMENT — LOCATION DETAILED DESCRIPTION DERM
LOCATION DETAILED: LEFT MEDIAL FRONTAL SCALP
LOCATION DETAILED: RIGHT SUPERIOR MEDIAL FOREHEAD
LOCATION DETAILED: LEFT CENTRAL PARIETAL SCALP
LOCATION DETAILED: LEFT SUPERIOR PARIETAL SCALP
LOCATION DETAILED: RIGHT MEDIAL FRONTAL SCALP
LOCATION DETAILED: LEFT LATERAL FRONTAL SCALP
LOCATION DETAILED: RIGHT SUPERIOR PARIETAL SCALP
LOCATION DETAILED: RIGHT CENTRAL FRONTAL SCALP
LOCATION DETAILED: RIGHT POSTERIOR PARIETAL SCALP
LOCATION DETAILED: RIGHT SUPERIOR POSTERIOR PARIETAL SCALP
LOCATION DETAILED: MID-FRONTAL SCALP
LOCATION DETAILED: LEFT SUPERIOR MEDIAL FOREHEAD
LOCATION DETAILED: RIGHT SUPERIOR FOREHEAD
LOCATION DETAILED: LEFT POSTERIOR PARIETAL SCALP
LOCATION DETAILED: LEFT INFERIOR POSTERIOR PARIETAL SCALP
LOCATION DETAILED: LEFT SUPERIOR OCCIPITAL SCALP
LOCATION DETAILED: POSTERIOR MID-PARIETAL SCALP
LOCATION DETAILED: MEDIAL FRONTAL SCALP
LOCATION DETAILED: RIGHT SUPERIOR FRONTAL SCALP
LOCATION DETAILED: LEFT SUPERIOR FOREHEAD
LOCATION DETAILED: LEFT CENTRAL FRONTAL SCALP
LOCATION DETAILED: LEFT OCCIPITAL SCALP
LOCATION DETAILED: LEFT SUPERIOR POSTERIOR PARIETAL SCALP

## 2021-10-08 ASSESSMENT — LOCATION SIMPLE DESCRIPTION DERM
LOCATION SIMPLE: LEFT OCCIPITAL SCALP
LOCATION SIMPLE: FRONTAL SCALP
LOCATION SIMPLE: RIGHT SCALP
LOCATION SIMPLE: ANTERIOR SCALP
LOCATION SIMPLE: POSTERIOR SCALP
LOCATION SIMPLE: RIGHT FOREHEAD
LOCATION SIMPLE: LEFT FOREHEAD
LOCATION SIMPLE: SCALP
LOCATION SIMPLE: LEFT SCALP

## 2021-10-08 ASSESSMENT — LOCATION ZONE DERM
LOCATION ZONE: SCALP
LOCATION ZONE: FACE
LOCATION ZONE: SCALP

## 2021-10-08 NOTE — HPI: HAIR LOSS (ALOPECIA AREATA)
Is This A New Presentation, Or A Follow-Up?: Follow Up Alopecia Areata
Additional History: Using the hair comb twice a week. Using Clobetasol qd one week on and one off. Stopped rogaine because it was stinging her scalp. Stopped that a mo ago

## 2021-10-08 NOTE — PROCEDURE: PRESCRIPTION MEDICATION MANAGEMENT
Render In Strict Bullet Format?: No
Discontinue Regimen: Minoxidil
Detail Level: Zone
Continue Regimen: Laser hair comb M, W, F, Clobetasol QD-BID one week on, one week off
Plan: We will also do injections today. She will follow up in six weeks and we will see how things are progressing. She did shave her head today and has noticed some hair growth.

## 2021-11-09 ENCOUNTER — APPOINTMENT (OUTPATIENT)
Dept: URBAN - METROPOLITAN AREA CLINIC 260 | Age: 21
Setting detail: DERMATOLOGY
End: 2021-11-10

## 2021-11-09 VITALS — HEIGHT: 72 IN | WEIGHT: 175 LBS

## 2021-11-09 DIAGNOSIS — B36.0 PITYRIASIS VERSICOLOR: ICD-10-CM

## 2021-11-09 DIAGNOSIS — L63.8 OTHER ALOPECIA AREATA: ICD-10-CM

## 2021-11-09 PROCEDURE — 99213 OFFICE O/P EST LOW 20 MIN: CPT | Mod: 25

## 2021-11-09 PROCEDURE — OTHER COUNSELING: OTHER

## 2021-11-09 PROCEDURE — OTHER INTRALESIONAL KENALOG: OTHER

## 2021-11-09 PROCEDURE — 11901 INJECT SKIN LESIONS >7: CPT

## 2021-11-09 PROCEDURE — OTHER MIPS QUALITY: OTHER

## 2021-11-09 PROCEDURE — OTHER PRESCRIPTION MEDICATION MANAGEMENT: OTHER

## 2021-11-09 PROCEDURE — OTHER PRESCRIPTION: OTHER

## 2021-11-09 RX ORDER — KETOCONAZOLE 20 MG/G
CREAM TOPICAL TWICE DAILY
Qty: 60 | Refills: 5 | Status: ERX | COMMUNITY
Start: 2021-11-09

## 2021-11-09 RX ORDER — KETOCONAZOLE 20 MG/ML
SHAMPOO, SUSPENSION TOPICAL BIW
Qty: 120 | Refills: 5 | Status: ERX | COMMUNITY
Start: 2021-11-09

## 2021-11-09 ASSESSMENT — LOCATION DETAILED DESCRIPTION DERM
LOCATION DETAILED: LEFT FOREHEAD
LOCATION DETAILED: RIGHT SUPERIOR MEDIAL FOREHEAD
LOCATION DETAILED: LEFT SUPERIOR MEDIAL FOREHEAD
LOCATION DETAILED: LEFT MEDIAL SUPERIOR CHEST
LOCATION DETAILED: LEFT SUPERIOR PARIETAL SCALP
LOCATION DETAILED: LEFT INFERIOR PARIETAL SCALP
LOCATION DETAILED: RIGHT CENTRAL FRONTAL SCALP
LOCATION DETAILED: RIGHT INFERIOR OCCIPITAL SCALP
LOCATION DETAILED: LEFT SUPERIOR UPPER BACK
LOCATION DETAILED: RIGHT INFERIOR PARIETAL SCALP
LOCATION DETAILED: RIGHT LATERAL FOREHEAD
LOCATION DETAILED: RIGHT SUPERIOR PARIETAL SCALP
LOCATION DETAILED: LEFT SUPERIOR OCCIPITAL SCALP
LOCATION DETAILED: LEFT SUPERIOR FRONTAL SCALP
LOCATION DETAILED: LEFT OCCIPITAL SCALP
LOCATION DETAILED: LEFT CENTRAL FRONTAL SCALP
LOCATION DETAILED: RIGHT CENTRAL PARIETAL SCALP
LOCATION DETAILED: MID-OCCIPITAL SCALP
LOCATION DETAILED: RIGHT SUPERIOR FOREHEAD
LOCATION DETAILED: MEDIAL FRONTAL SCALP

## 2021-11-09 ASSESSMENT — LOCATION ZONE DERM
LOCATION ZONE: FACE
LOCATION ZONE: SCALP
LOCATION ZONE: TRUNK
LOCATION ZONE: SCALP

## 2021-11-09 ASSESSMENT — LOCATION SIMPLE DESCRIPTION DERM
LOCATION SIMPLE: LEFT FOREHEAD
LOCATION SIMPLE: LEFT SCALP
LOCATION SIMPLE: SCALP
LOCATION SIMPLE: RIGHT FOREHEAD
LOCATION SIMPLE: FRONTAL SCALP
LOCATION SIMPLE: POSTERIOR SCALP
LOCATION SIMPLE: CHEST
LOCATION SIMPLE: RIGHT SCALP
LOCATION SIMPLE: LEFT UPPER BACK

## 2021-11-09 NOTE — HPI: HAIR LOSS (ALOPECIA AREATA)
How Severe Is It?: severe
Is This A New Presentation, Or A Follow-Up?: Follow Up Alopecia Areata
Additional History: S she has been using the clobetasol solution once a day one week on and one week break before resuming. She’s also been using the laser hair comb Monday Wednesday Friday and using the minoxidil solution on those off days.

## 2021-11-09 NOTE — PROCEDURE: MIPS QUALITY
Quality 402: Tobacco Use And Help With Quitting Among Adolescents: Patient screened for tobacco and never smoked
Detail Level: Detailed
Quality 110: Preventive Care And Screening: Influenza Immunization: Influenza Immunization Administered during Influenza season
Quality 431: Preventive Care And Screening: Unhealthy Alcohol Use - Screening: Patient not identified as an unhealthy alcohol user when screened for unhealthy alcohol use using a systematic screening method
Quality 130: Documentation Of Current Medications In The Medical Record: Current Medications Documented

## 2021-11-09 NOTE — PROCEDURE: COUNSELING
Detail Level: Detailed
Patient Specific Counseling (Will Not Stick From Patient To Patient): Patient instructed to see Dr. Cowart for further evaluation and treatment\\nContinue clobetasol solution and she will do the solution every other day on Monday Wednesday Fridays. She will use the laser hair comb comb three times a week as well and alternate the clobetasol with minoxidil

## 2021-11-09 NOTE — PROCEDURE: INTRALESIONAL KENALOG
Concentration Of Solution Injected (Mg/Ml): 7.5
Include Z78.9 (Other Specified Conditions Influencing Health Status) As An Associated Diagnosis?: No
Medical Necessity Clause: This procedure was medically necessary because the lesions that were treated were:
Total Volume Injected (Ccs- Only Use Numbers And Decimals): 2.4
Validate Note Data When Using Inventory: Yes
Administered By (Optional): NII
Kenalog Preparation: Kenalog
Consent: The risks of atrophy were reviewed with the patient.
Detail Level: Zone
X Size Of Lesion In Cm (Optional): 0

## 2021-11-09 NOTE — PROCEDURE: PRESCRIPTION MEDICATION MANAGEMENT
Detail Level: Zone
Render In Strict Bullet Format?: No
Initiate Treatment: Ketoconazole cream and shampoo

## 2021-11-24 ENCOUNTER — RX ONLY (RX ONLY)
Age: 21
End: 2021-11-24

## 2021-11-24 RX ORDER — CLOBETASOL PROPIONATE 0.5 MG/ML
SOLUTION TOPICAL
Qty: 50 | Refills: 2 | Status: ERX

## 2022-05-13 ENCOUNTER — APPOINTMENT (OUTPATIENT)
Dept: URBAN - METROPOLITAN AREA CLINIC 260 | Age: 22
Setting detail: DERMATOLOGY
End: 2022-05-13

## 2022-05-13 VITALS — HEIGHT: 69 IN | WEIGHT: 183.8 LBS

## 2022-05-13 DIAGNOSIS — L50.1 IDIOPATHIC URTICARIA: ICD-10-CM

## 2022-05-13 PROCEDURE — OTHER COUNSELING: OTHER

## 2022-05-13 PROCEDURE — 99213 OFFICE O/P EST LOW 20 MIN: CPT

## 2022-05-13 PROCEDURE — OTHER MIPS QUALITY: OTHER

## 2022-05-13 PROCEDURE — OTHER PRESCRIPTION MEDICATION MANAGEMENT: OTHER

## 2022-05-13 ASSESSMENT — LOCATION SIMPLE DESCRIPTION DERM
LOCATION SIMPLE: RIGHT HAND
LOCATION SIMPLE: ABDOMEN
LOCATION SIMPLE: LEFT FOREARM

## 2022-05-13 ASSESSMENT — LOCATION ZONE DERM
LOCATION ZONE: TRUNK
LOCATION ZONE: ARM
LOCATION ZONE: HAND

## 2022-05-13 ASSESSMENT — LOCATION DETAILED DESCRIPTION DERM
LOCATION DETAILED: LEFT VENTRAL PROXIMAL FOREARM
LOCATION DETAILED: RIGHT RADIAL DORSAL HAND
LOCATION DETAILED: PERIUMBILICAL SKIN

## 2022-05-13 NOTE — PROCEDURE: PRESCRIPTION MEDICATION MANAGEMENT
Plan: She will follow up with her allergist to perform allergy shots. She had allergy testing and found out she is  allergic to cats and has really bad seasonal allergies and asthma so this will hopefully improve her rash. She’s already been following up with her allergist so I will have her continue to do so
Initiate Treatment: Clobetasol (patient has prescription). OTC Calamine, Tiger Balm and Zyrtec
Detail Level: Zone
Render In Strict Bullet Format?: No

## 2022-05-13 NOTE — HPI: RASH
How Severe Is Your Rash?: moderate
Is This A New Presentation, Or A Follow-Up?: Rash
Additional History: Saw a virtual doc in February and March and they both said it was dermatitis.\\nHad allergy testing 3-4 days ago.\\nThe tmc, benedryl cream, and hydrocortisone didn’t help\\nAllergist thinks it’s hives related to her seasonal allergies and cats as she has a cat at home.\\nTakes Allegra, Flonase,

## 2022-05-13 NOTE — PROCEDURE: MIPS QUALITY
Quality 110: Preventive Care And Screening: Influenza Immunization: Influenza Immunization Administered during Influenza season
Quality 130: Documentation Of Current Medications In The Medical Record: Current Medications Documented
Quality 226: Preventive Care And Screening: Tobacco Use: Screening And Cessation Intervention: Tobacco Screening not Performed for Medical Reasons
Detail Level: Detailed
Quality 431: Preventive Care And Screening: Unhealthy Alcohol Use - Screening: Patient not identified as an unhealthy alcohol user when screened for unhealthy alcohol use using a systematic screening method

## 2022-05-15 ENCOUNTER — HEALTH MAINTENANCE LETTER (OUTPATIENT)
Age: 22
End: 2022-05-15

## 2022-05-16 NOTE — PROCEDURES
[facility] Summary (3/23/2021)    Frontal anterior    Mid scalp    Vertex    Occipital     Right temporal    Left temporal    Summary

## 2022-08-17 ENCOUNTER — APPOINTMENT (OUTPATIENT)
Dept: URBAN - METROPOLITAN AREA CLINIC 260 | Age: 22
Setting detail: DERMATOLOGY
End: 2022-08-17

## 2022-08-17 VITALS — WEIGHT: 181 LBS | HEIGHT: 69 IN

## 2022-08-17 DIAGNOSIS — L63.8 OTHER ALOPECIA AREATA: ICD-10-CM

## 2022-08-17 PROCEDURE — OTHER PHOTO-DOCUMENTATION: OTHER

## 2022-08-17 PROCEDURE — OTHER MIPS QUALITY: OTHER

## 2022-08-17 PROCEDURE — 11901 INJECT SKIN LESIONS >7: CPT

## 2022-08-17 PROCEDURE — OTHER PRESCRIPTION MEDICATION MANAGEMENT: OTHER

## 2022-08-17 PROCEDURE — OTHER INTRALESIONAL KENALOG: OTHER

## 2022-08-17 PROCEDURE — OTHER COUNSELING: OTHER

## 2022-08-17 ASSESSMENT — LOCATION DETAILED DESCRIPTION DERM
LOCATION DETAILED: RIGHT CENTRAL PARIETAL SCALP
LOCATION DETAILED: LEFT SUPERIOR PARIETAL SCALP
LOCATION DETAILED: RIGHT OCCIPITAL SCALP
LOCATION DETAILED: LEFT SUPERIOR OCCIPITAL SCALP
LOCATION DETAILED: MID-OCCIPITAL SCALP
LOCATION DETAILED: LEFT CENTRAL OCCIPITAL SCALP
LOCATION DETAILED: LEFT OCCIPITAL SCALP
LOCATION DETAILED: LEFT CENTRAL PARIETAL SCALP
LOCATION DETAILED: LEFT INFERIOR FRONTAL SCALP
LOCATION DETAILED: RIGHT INFERIOR PARIETAL SCALP
LOCATION DETAILED: LEFT INFERIOR OCCIPITAL SCALP
LOCATION DETAILED: RIGHT CENTRAL FRONTAL SCALP
LOCATION DETAILED: LEFT INFERIOR PARIETAL SCALP
LOCATION DETAILED: RIGHT SUPERIOR PARIETAL SCALP
LOCATION DETAILED: MEDIAL FRONTAL SCALP
LOCATION DETAILED: LEFT CENTRAL FRONTAL SCALP
LOCATION DETAILED: LEFT LATERAL FRONTAL SCALP
LOCATION DETAILED: RIGHT POSTAURICULAR SKIN

## 2022-08-17 ASSESSMENT — LOCATION SIMPLE DESCRIPTION DERM
LOCATION SIMPLE: POSTERIOR SCALP
LOCATION SIMPLE: FRONTAL SCALP
LOCATION SIMPLE: SCALP
LOCATION SIMPLE: LEFT SCALP

## 2022-08-17 ASSESSMENT — LOCATION ZONE DERM
LOCATION ZONE: SCALP
LOCATION ZONE: SCALP

## 2022-08-17 NOTE — PROCEDURE: MIPS QUALITY
Detail Level: Detailed
Quality 402: Tobacco Use And Help With Quitting Among Adolescents: Patient screened for tobacco and never smoked
Quality 110: Preventive Care And Screening: Influenza Immunization: Influenza Immunization Administered during Influenza season
Quality 130: Documentation Of Current Medications In The Medical Record: Current Medications Documented
Quality 431: Preventive Care And Screening: Unhealthy Alcohol Use - Screening: Patient not identified as an unhealthy alcohol user when screened for unhealthy alcohol use using a systematic screening method

## 2022-08-17 NOTE — HPI: HAIR LOSS (ALOPECIA AREATA)
How Severe Is It?: severe
Is This A New Presentation, Or A Follow-Up?: Follow Up Alopecia Areata
Additional History: She’s here today to have Kenalog injections. She has been stressed recently because of her job but she is interviewing for a new one.

## 2022-08-17 NOTE — PROCEDURE: PRESCRIPTION MEDICATION MANAGEMENT
Continue Regimen: Clobetasol soln two weeks on two weeks off
Plan: We discussed OLUMIENT and she will look into this on her own. If she would like to pursue this she will let us know. Discussed the side effects. She is on birth control however she has no other risk factors for clotting, increased risk for  lymphoma or cardiovascular disease
Detail Level: Zone
Render In Strict Bullet Format?: No

## 2022-08-26 ENCOUNTER — RX ONLY (RX ONLY)
Age: 22
End: 2022-08-26

## 2022-08-26 RX ORDER — VALACYCLOVIR 1 G/1
TABLET, FILM COATED ORAL
Qty: 30 | Refills: 6 | Status: ERX | COMMUNITY
Start: 2022-08-25

## 2022-09-11 ENCOUNTER — HEALTH MAINTENANCE LETTER (OUTPATIENT)
Age: 22
End: 2022-09-11

## 2022-11-22 ENCOUNTER — RX ONLY (RX ONLY)
Age: 22
End: 2022-11-22

## 2022-11-22 RX ORDER — CLOBETASOL PROPIONATE 0.5 MG/ML
SOLUTION TOPICAL
Qty: 50 | Refills: 2 | Status: ERX

## 2023-02-06 NOTE — PROCEDURE: INTRALESIONAL KENALOG
Addended by: Miri Maldonado on: 2/6/2023 11:48 AM     Modules accepted: Orders Expiration Date (Optional): 06/2022

## 2023-02-08 ENCOUNTER — APPOINTMENT (OUTPATIENT)
Dept: URBAN - METROPOLITAN AREA CLINIC 260 | Age: 23
Setting detail: DERMATOLOGY
End: 2023-02-09

## 2023-02-08 VITALS — HEIGHT: 70 IN | WEIGHT: 182 LBS

## 2023-02-08 DIAGNOSIS — L65.9 NONSCARRING HAIR LOSS, UNSPECIFIED: ICD-10-CM

## 2023-02-08 DIAGNOSIS — L63.8 OTHER ALOPECIA AREATA: ICD-10-CM

## 2023-02-08 PROCEDURE — OTHER PRESCRIPTION: OTHER

## 2023-02-08 PROCEDURE — OTHER MIPS QUALITY: OTHER

## 2023-02-08 PROCEDURE — OTHER PRESCRIPTION MEDICATION MANAGEMENT: OTHER

## 2023-02-08 PROCEDURE — OTHER COUNSELING: OTHER

## 2023-02-08 PROCEDURE — OTHER PHOTO-DOCUMENTATION: OTHER

## 2023-02-08 PROCEDURE — OTHER INTRALESIONAL KENALOG: OTHER

## 2023-02-08 PROCEDURE — 99214 OFFICE O/P EST MOD 30 MIN: CPT | Mod: 25

## 2023-02-08 PROCEDURE — 11901 INJECT SKIN LESIONS >7: CPT

## 2023-02-08 RX ORDER — CLOBETASOL PROPIONATE 0.5 MG/ML
SOLUTION TOPICAL QD
Qty: 50 | Refills: 6 | Status: ERX

## 2023-02-08 ASSESSMENT — LOCATION DETAILED DESCRIPTION DERM
LOCATION DETAILED: RIGHT SUPERIOR POSTERIOR PARIETAL SCALP
LOCATION DETAILED: LEFT SUPERIOR PARIETAL SCALP
LOCATION DETAILED: RIGHT CENTRAL FRONTAL SCALP
LOCATION DETAILED: POSTERIOR MID-PARIETAL SCALP
LOCATION DETAILED: MID-OCCIPITAL SCALP
LOCATION DETAILED: MEDIAL FRONTAL SCALP
LOCATION DETAILED: LEFT INFERIOR PARIETAL SCALP
LOCATION DETAILED: LEFT OCCIPITAL SCALP
LOCATION DETAILED: RIGHT SUPERIOR FRONTAL SCALP
LOCATION DETAILED: LEFT MEDIAL FRONTAL SCALP
LOCATION DETAILED: RIGHT OCCIPITAL SCALP
LOCATION DETAILED: LEFT SUPERIOR FRONTAL SCALP
LOCATION DETAILED: LEFT CENTRAL FRONTAL SCALP
LOCATION DETAILED: LEFT CENTRAL PARIETAL SCALP

## 2023-02-08 ASSESSMENT — LOCATION SIMPLE DESCRIPTION DERM
LOCATION SIMPLE: SCALP
LOCATION SIMPLE: POSTERIOR SCALP
LOCATION SIMPLE: LEFT SCALP
LOCATION SIMPLE: FRONTAL SCALP

## 2023-02-08 ASSESSMENT — LOCATION ZONE DERM
LOCATION ZONE: SCALP
LOCATION ZONE: SCALP

## 2023-02-08 NOTE — HPI: HAIR LOSS (ALOPECIA AREATA)
How Severe Is It?: severe
Is This A New Presentation, Or A Follow-Up?: Follow Up Alopecia Areata
Additional History: Life stressors including moving and roommate changes

## 2023-02-08 NOTE — PROCEDURE: PRESCRIPTION MEDICATION MANAGEMENT
Detail Level: Zone
Initiate Treatment: kenalog injections today + Clobetasol soln
Plan: Follow up for a recheck in 3-4 weeks.
Render In Strict Bullet Format?: No
Continue Regimen: clobetasol 0.05 % scalp solution BID x 2 weeks on and off
Plan: She should work on stress management as a lot of it has to do with her moving into a new place

## 2023-06-03 ENCOUNTER — HEALTH MAINTENANCE LETTER (OUTPATIENT)
Age: 23
End: 2023-06-03

## 2024-01-23 ENCOUNTER — RX ONLY (RX ONLY)
Age: 24
End: 2024-01-23

## 2024-01-23 RX ORDER — DROSPIRENONE AND ETHINYL ESTRADIOL 0.02-3(28)
KIT ORAL QD
Qty: 84 | Refills: 0 | Status: ERX | COMMUNITY
Start: 2024-01-22

## 2024-03-28 ENCOUNTER — APPOINTMENT (OUTPATIENT)
Dept: URBAN - METROPOLITAN AREA CLINIC 260 | Age: 24
Setting detail: DERMATOLOGY
End: 2024-03-28

## 2024-03-28 VITALS — WEIGHT: 185 LBS | HEIGHT: 70 IN

## 2024-03-28 DIAGNOSIS — B36.0 PITYRIASIS VERSICOLOR: ICD-10-CM

## 2024-03-28 DIAGNOSIS — L81.4 OTHER MELANIN HYPERPIGMENTATION: ICD-10-CM

## 2024-03-28 DIAGNOSIS — L63.8 OTHER ALOPECIA AREATA: ICD-10-CM

## 2024-03-28 DIAGNOSIS — L80 VITILIGO: ICD-10-CM

## 2024-03-28 DIAGNOSIS — L81.5 LEUKODERMA, NOT ELSEWHERE CLASSIFIED: ICD-10-CM

## 2024-03-28 PROCEDURE — 99214 OFFICE O/P EST MOD 30 MIN: CPT

## 2024-03-28 PROCEDURE — OTHER PRESCRIPTION MEDICATION MANAGEMENT: OTHER

## 2024-03-28 PROCEDURE — OTHER COUNSELING: OTHER

## 2024-03-28 PROCEDURE — OTHER PRESCRIPTION: OTHER

## 2024-03-28 PROCEDURE — OTHER ADDITIONAL NOTES: OTHER

## 2024-03-28 PROCEDURE — OTHER PHOTO-DOCUMENTATION: OTHER

## 2024-03-28 PROCEDURE — OTHER MIPS QUALITY: OTHER

## 2024-03-28 RX ORDER — HYDROQUINONE 4 %
CREAM (GRAM) TOPICAL BID
Qty: 30 | Refills: 2 | Status: ERX | COMMUNITY
Start: 2024-03-28

## 2024-03-28 RX ORDER — CLOBETASOL PROPIONATE 0.5 MG/ML
SOLUTION TOPICAL QD
Qty: 50 | Refills: 6 | Status: ERX

## 2024-03-28 RX ORDER — KETOCONAZOLE 20 MG/G
CREAM TOPICAL BID
Qty: 60 | Refills: 1 | Status: ERX | COMMUNITY
Start: 2024-03-28

## 2024-03-28 RX ORDER — RUXOLITINIB 15 MG/G
CREAM TOPICAL BID
Qty: 60 | Refills: 1 | Status: ERX | COMMUNITY
Start: 2024-03-28

## 2024-03-28 ASSESSMENT — LOCATION ZONE DERM
LOCATION ZONE: SCALP
LOCATION ZONE: SCALP
LOCATION ZONE: FACE
LOCATION ZONE: TRUNK
LOCATION ZONE: LEG
LOCATION ZONE: NECK

## 2024-03-28 ASSESSMENT — LOCATION SIMPLE DESCRIPTION DERM
LOCATION SIMPLE: RIGHT LOWER BACK
LOCATION SIMPLE: LEFT THIGH
LOCATION SIMPLE: SCALP
LOCATION SIMPLE: RIGHT PRETIBIAL REGION
LOCATION SIMPLE: LEFT PRETIBIAL REGION
LOCATION SIMPLE: FRONTAL SCALP
LOCATION SIMPLE: RIGHT THIGH
LOCATION SIMPLE: RIGHT ANTERIOR NECK
LOCATION SIMPLE: RIGHT CHEEK
LOCATION SIMPLE: LEFT CHEEK

## 2024-03-28 ASSESSMENT — LOCATION DETAILED DESCRIPTION DERM
LOCATION DETAILED: RIGHT CENTRAL MALAR CHEEK
LOCATION DETAILED: LEFT INFERIOR CENTRAL MALAR CHEEK
LOCATION DETAILED: RIGHT INFERIOR MEDIAL LOWER BACK
LOCATION DETAILED: RIGHT DISTAL PRETIBIAL REGION
LOCATION DETAILED: LEFT DISTAL PRETIBIAL REGION
LOCATION DETAILED: LEFT SUPERIOR PARIETAL SCALP
LOCATION DETAILED: RIGHT ANTERIOR DISTAL THIGH
LOCATION DETAILED: LEFT ANTERIOR DISTAL THIGH
LOCATION DETAILED: RIGHT INFERIOR ANTERIOR NECK
LOCATION DETAILED: MEDIAL FRONTAL SCALP

## 2024-03-28 ASSESSMENT — BSA VITILIGO: % BODY COVERED IN VITILIGO: 1

## 2024-03-28 NOTE — PROCEDURE: PRESCRIPTION MEDICATION MANAGEMENT
Render In Strict Bullet Format?: No
Initiate Treatment: ketoconazole 2% cream BID for 2-4 weeks until resolution
Detail Level: Zone
Plan: Briefly discussed option of Olumiant, but patient deferred at this time. Prefers to continue with topical steroids. Doing well and only has one small spot on her scalp\\nShe has been under more stress recently with going back to school and also working.
Continue Regimen: clobetasol 0.05 % scalp solution BID x 2 weeks on and off
Initiate Treatment: hydroquinone 4% cream BID for 2 months, then take a 1 month break

## 2024-03-28 NOTE — PROCEDURE: ADDITIONAL NOTES
Detail Level: Simple
Render Risk Assessment In Note?: no
Additional Notes: Discussed spot of hypopigmentation is not entirely consistent with idiopathic guttate hypomelanosis, so recommend trying a sample of Opzelura to see if there is any improvement.\\nDisc that there is a small area on her neck that could be vitiligo. She is at an increased risk for vitiligo due to her alopecia areata.

## 2024-08-17 NOTE — PROCEDURE: COUNSELING
Patient given bentyl 20mg PRN PO for abdominal pain. See flowsheet for pain assessment.     18:33: 20mg PO PRN bentyl moderately effective for abdominal pain. See flowsheet for pain assessment.    Patient Specific Counseling (Will Not Stick From Patient To Patient): Recommended KIL injections again. She agreed. She is to continue with the Clobetasol solution 0.05%. No RF needed today. She goes to school in [place]. Patient Specific Counseling (Will Not Stick From Patient To Patient): Recommended KIL injections again. She agreed. She is to continue with the Clobetasol solution 0.05%. No RF needed today. She goes to school in [place].